# Patient Record
Sex: FEMALE | Race: WHITE | Employment: STUDENT | URBAN - METROPOLITAN AREA
[De-identification: names, ages, dates, MRNs, and addresses within clinical notes are randomized per-mention and may not be internally consistent; named-entity substitution may affect disease eponyms.]

---
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
## 2024-01-17 ENCOUNTER — LAB ENCOUNTER (OUTPATIENT)
Dept: LAB | Age: 19
End: 2024-01-17
Attending: FAMILY MEDICINE
Payer: COMMERCIAL

## 2024-01-17 ENCOUNTER — PATIENT MESSAGE (OUTPATIENT)
Dept: FAMILY MEDICINE CLINIC | Facility: CLINIC | Age: 19
End: 2024-01-17

## 2024-01-17 ENCOUNTER — OFFICE VISIT (OUTPATIENT)
Dept: FAMILY MEDICINE CLINIC | Facility: CLINIC | Age: 19
End: 2024-01-17

## 2024-01-17 VITALS
BODY MASS INDEX: 18.23 KG/M2 | HEART RATE: 89 BPM | HEIGHT: 62 IN | DIASTOLIC BLOOD PRESSURE: 62 MMHG | SYSTOLIC BLOOD PRESSURE: 96 MMHG | TEMPERATURE: 98 F | WEIGHT: 99.06 LBS

## 2024-01-17 DIAGNOSIS — N39.0 RECURRENT UTI: Primary | ICD-10-CM

## 2024-01-17 DIAGNOSIS — F50.9 EATING DISORDER, UNSPECIFIED TYPE: ICD-10-CM

## 2024-01-17 DIAGNOSIS — Z87.442 HISTORY OF NEPHROLITHIASIS: ICD-10-CM

## 2024-01-17 DIAGNOSIS — N39.0 RECURRENT UTI: ICD-10-CM

## 2024-01-17 LAB
BILIRUB UR QL: NEGATIVE
CLARITY UR: CLEAR
GLUCOSE UR-MCNC: NORMAL MG/DL
KETONES UR-MCNC: NEGATIVE MG/DL
LEUKOCYTE ESTERASE UR QL STRIP.AUTO: 75
NITRITE UR QL STRIP.AUTO: NEGATIVE
PH UR: 6.5 [PH] (ref 5–8)
PROT UR-MCNC: NEGATIVE MG/DL
SP GR UR STRIP: 1.01 (ref 1–1.03)
UROBILINOGEN UR STRIP-ACNC: NORMAL

## 2024-01-17 PROCEDURE — 3008F BODY MASS INDEX DOCD: CPT | Performed by: FAMILY MEDICINE

## 2024-01-17 PROCEDURE — 87086 URINE CULTURE/COLONY COUNT: CPT

## 2024-01-17 PROCEDURE — 3078F DIAST BP <80 MM HG: CPT | Performed by: FAMILY MEDICINE

## 2024-01-17 PROCEDURE — 3074F SYST BP LT 130 MM HG: CPT | Performed by: FAMILY MEDICINE

## 2024-01-17 PROCEDURE — 99214 OFFICE O/P EST MOD 30 MIN: CPT | Performed by: FAMILY MEDICINE

## 2024-01-17 PROCEDURE — 81001 URINALYSIS AUTO W/SCOPE: CPT

## 2024-01-17 NOTE — PROGRESS NOTES
HPI:    Patient ID: Lore Avila is a 18 year old adult.      HPI    Chief Complaint   Patient presents with    Urgent Care F/u     Has been getting frequent UTIS has been taking cranberry juice and using good hygiene     Other     Diagnosed with ARFID food disorder diagnosed thru her nutritionist would like to confirm due to insurance purposes.       Wt Readings from Last 6 Encounters:   01/17/24 99 lb 0.6 oz (44.9 kg) (3%, Z= -1.89)*   11/16/23 96 lb 3.2 oz (43.6 kg) (2%, Z= -2.15)*   11/09/23 97 lb (44 kg) (2%, Z= -2.07)*   10/23/23 96 lb (43.5 kg) (2%, Z= -2.17)*   10/03/23 95 lb 12.8 oz (43.5 kg) (1%, Z= -2.18)*   09/19/23 96 lb 6.4 oz (43.7 kg) (2%, Z= -2.12)*     * Growth percentiles are based on CDC (Girls, 2-20 Years) data.     BP Readings from Last 3 Encounters:   01/17/24 96/62   12/19/23 95/60   11/18/23 96/67     Feels she keeps getting \"utis\"  Thinks has had 6-7 this last year    Documented 4 positive urine cultures last year with e coli    Has had kidney stone twice- first 2021, saw DR Hearn  Then had stones again 10/2023    CT abd/ pelvis 10/23/23    CONCLUSION:      1. Multiple bilateral non-obstructing renal calculi measuring up to 3 mm.   2. Increased density at the bilateral corticomedullary junctions , which can be seen in the setting of medullary nephrocalcinosis.   3. No hydronephrosis.  Unremarkable appearance of the bladder.         does drink a lot of water - 16 oz x 3  Voids every 3 hrs   No blood    On her menses today    No back pain, fever/ chills      Patient seeing a therapist  Has been diagnosed avoidant restrictive food disorder   Seeing psychiatrist     Review of Systems   Constitutional:  Negative for chills and fever.   Respiratory:  Negative for cough and shortness of breath.    Gastrointestinal:  Negative for abdominal pain, constipation, diarrhea, nausea and vomiting.   Genitourinary:  Negative for decreased urine volume, dysuria, frequency, hematuria, menstrual problem,  pelvic pain and urgency.       BP 96/62   Pulse 89   Temp 98.1 °F (36.7 °C) (Temporal)   Ht 5' 2\" (1.575 m)   Wt 99 lb 0.6 oz (44.9 kg)   LMP 01/13/2024 (Exact Date)   BMI 18.11 kg/m²          Current Outpatient Medications   Medication Sig Dispense Refill    Norethin Ace-Eth Estrad-FE (JUNEL FE 1/20) 1-20 MG-MCG Oral Tab Take 1 tablet by mouth daily. 28 tablet 12    albuterol 108 (90 Base) MCG/ACT Inhalation Aero Soln Inhale 1 puff and hold breath for 10 seconds then release.  Wait 1 full minute and repeat for second puff.  Use every 4-6 hours as needed. 18 g 3    clonazePAM 0.5 MG Oral Tablet Dispersible Take 1 tablet (0.5 mg total) by mouth 2 (two) times daily as needed. 14 tablet 0     Allergies:  Allergies   Allergen Reactions    Penicillin G Benzathine RASH      PHYSICAL EXAM:     Chief Complaint   Patient presents with    Urgent Care F/u     Has been getting frequent UTIS has been taking cranberry juice and using good hygiene     Other     Diagnosed with ARFID food disorder diagnosed thru her nutritionist would like to confirm due to insurance purposes.      Physical Exam  Vitals reviewed.   Abdominal:      General: There is no distension.      Palpations: There is no mass.      Tenderness: There is abdominal tenderness. There is no right CVA tenderness, left CVA tenderness, guarding or rebound.      Hernia: No hernia is present.      Comments: Feels some discomfort left side abdomen    Musculoskeletal:      Cervical back: Normal range of motion and neck supple.   Neurological:      Mental Status: She is alert.                ASSESSMENT/PLAN:     Encounter Diagnoses   Name Primary?    Recurrent UTI Yes    History of nephrolithiasis     Eating disorder, unspecified type        1. Recurrent UTI  Push fluids  Cranberry juice  Ibuprofen  Void after sex  Wipe from front to back  No feminine products      - US KIDNEY/BLADDER (CPT=76770); Future  - UROLOGY - INTERNAL  - Urinalysis, Routine; Future  - Urine  Culture, Routine; Future    2. History of nephrolithiasis    - US KIDNEY/BLADDER (CPT=76770); Future  - UROLOGY - INTERNAL  - Urine Culture, Routine; Future    3. Eating disorder, unspecified type  Follow up psychiatry       Orders Placed This Encounter   Procedures    Urinalysis, Routine    Urine Culture, Routine         The above note was creating using Dragon speech recognition technology. Please excuse any typos    Meds This Visit:  Requested Prescriptions      No prescriptions requested or ordered in this encounter       Imaging & Referrals:  UROLOGY - INTERNAL  US KIDNEY/BLADDER (CPT=76770)       ID#[id number]

## 2024-01-19 NOTE — TELEPHONE ENCOUNTER
From: Lore Avila  To: Jenaro Jaquez  Sent: 1/17/2024 9:35 PM CST  Subject: test results    what do my test results mean exactly, do i have a uti cause i dont really understand what they mean

## 2024-01-29 ENCOUNTER — HOSPITAL ENCOUNTER (OUTPATIENT)
Dept: ULTRASOUND IMAGING | Age: 19
Discharge: HOME | End: 2024-01-29
Attending: FAMILY MEDICINE
Payer: COMMERCIAL

## 2024-01-29 DIAGNOSIS — N39.0 RECURRENT UTI: ICD-10-CM

## 2024-01-29 DIAGNOSIS — Z87.442 HISTORY OF NEPHROLITHIASIS: ICD-10-CM

## 2024-01-29 PROCEDURE — 76770 US EXAM ABDO BACK WALL COMP: CPT | Performed by: FAMILY MEDICINE

## 2024-01-29 RX ORDER — NORETHINDRONE ACETATE AND ETHINYL ESTRADIOL 1MG-20(21)
1 KIT ORAL DAILY
Qty: 28 TABLET | Refills: 12 | OUTPATIENT
Start: 2024-01-29 | End: 2025-01-28

## 2024-02-05 ENCOUNTER — OFFICE VISIT (OUTPATIENT)
Dept: FAMILY MEDICINE CLINIC | Facility: CLINIC | Age: 19
End: 2024-02-05
Payer: COMMERCIAL

## 2024-02-05 VITALS
HEIGHT: 62 IN | WEIGHT: 100.81 LBS | TEMPERATURE: 98 F | OXYGEN SATURATION: 98 % | BODY MASS INDEX: 18.55 KG/M2 | SYSTOLIC BLOOD PRESSURE: 98 MMHG | DIASTOLIC BLOOD PRESSURE: 62 MMHG | RESPIRATION RATE: 16 BRPM | HEART RATE: 83 BPM

## 2024-02-05 DIAGNOSIS — R39.9 UTI SYMPTOMS: Primary | ICD-10-CM

## 2024-02-05 DIAGNOSIS — Z76.89 RETURN TO WORK EVALUATION: ICD-10-CM

## 2024-02-05 LAB
APPEARANCE: CLEAR
BILIRUBIN: NEGATIVE
GLUCOSE (URINE DIPSTICK): NEGATIVE MG/DL
KETONES (URINE DIPSTICK): NEGATIVE MG/DL
MULTISTIX LOT#: ABNORMAL NUMERIC
NITRITE, URINE: NEGATIVE
OCCULT BLOOD: NEGATIVE
PH, URINE: 5.5 (ref 4.5–8)
PROTEIN (URINE DIPSTICK): NEGATIVE MG/DL
SPECIFIC GRAVITY: 1.01 (ref 1–1.03)
URINE-COLOR: YELLOW
UROBILINOGEN,SEMI-QN: 0.2 MG/DL (ref 0–1.9)

## 2024-02-05 PROCEDURE — 3008F BODY MASS INDEX DOCD: CPT | Performed by: NURSE PRACTITIONER

## 2024-02-05 PROCEDURE — 87086 URINE CULTURE/COLONY COUNT: CPT | Performed by: NURSE PRACTITIONER

## 2024-02-05 PROCEDURE — 3074F SYST BP LT 130 MM HG: CPT | Performed by: NURSE PRACTITIONER

## 2024-02-05 PROCEDURE — 3078F DIAST BP <80 MM HG: CPT | Performed by: NURSE PRACTITIONER

## 2024-02-05 PROCEDURE — 99213 OFFICE O/P EST LOW 20 MIN: CPT | Performed by: NURSE PRACTITIONER

## 2024-02-05 PROCEDURE — 81003 URINALYSIS AUTO W/O SCOPE: CPT | Performed by: NURSE PRACTITIONER

## 2024-02-05 NOTE — PROGRESS NOTES
[facility]  Obstetrics and Gynecology  Focused Gynecology Problem Visit  Vernell Espinal, MSN, APRN, FNP-BC    HPI   Lore Avila is a 18 year old adult who presents to the clinic for Nausea (Nausea, diarrhea, abd cramp, nasal congestion, on and off burning w urination, body aches, chills  /Sx onset Sat /Denies fever /Requesting work note )    Pt has a history of UTI's, currently has had intermittent burning with urination and some viral symptoms that started today. Denies other UTI symptoms today such as frequency, urgency, and incontinence. Denies vaginal symptoms such as changes in discharge, itch, odor, and irritation. Denies abnormal vaginal bleeding and pelvic pain. Declines STI testing today. Not taking any medication over the counter currently.  No COVID testing at home.  Received COVID vaccines.  Declines viral testing today.  Reports that her symptoms are mild.    MEDICATIONS     Current Outpatient Medications   Medication Sig Dispense Refill    Norethin Ace-Eth Estrad-FE (JUNEL FE 1/20) 1-20 MG-MCG Oral Tab Take 1 tablet by mouth daily. 28 tablet 12    albuterol 108 (90 Base) MCG/ACT Inhalation Aero Soln Inhale 1 puff and hold breath for 10 seconds then release.  Wait 1 full minute and repeat for second puff.  Use every 4-6 hours as needed. 18 g 3    clonazePAM 0.5 MG Oral Tablet Dispersible Take 1 tablet (0.5 mg total) by mouth 2 (two) times daily as needed. 14 tablet 0       ALLERGIES     Allergies   Allergen Reactions    Penicillin G Benzathine RASH       HISTORY     OB History   No obstetric history on file.       Past Medical History:   Diagnosis Date    Extrinsic asthma, unspecified     Nephrolithiasis     Scoliosis        Past Surgical History:   Procedure Laterality Date    OTHER SURGICAL HISTORY  10/30/2021    Lt RPG, Lt URS, Lt stent insertion  - Dr. Hearn       Family History   Problem Relation Age of Onset    No Known Problems Father     Cancer Paternal Grandmother     Suicide History  Maternal Cousin 19        crashed car into a tree    Diabetes Neg     Hypertension Neg     Heart Disorder Neg        Social History     Socioeconomic History    Marital status: Single     Spouse name: Not on file    Number of children: Not on file    Years of education: Not on file    Highest education level: Not on file   Occupational History    Not on file   Tobacco Use    Smoking status: Never     Passive exposure: Yes    Smokeless tobacco: Never   Vaping Use    Vaping Use: Former   Substance and Sexual Activity    Alcohol use: Yes     Comment: rarely at family partie - takes a sip.    Drug use: Not Currently     Types: Cannabis     Comment: a month and 1/2  ago smoked cannabis.  Used a couple puffs once a week.    Sexual activity: Not on file   Other Topics Concern     Service Not Asked    Blood Transfusions Not Asked    Caffeine Concern Yes     Comment: rarely soda    Occupational Exposure Not Asked    Hobby Hazards Not Asked    Sleep Concern Not Asked    Stress Concern Not Asked    Weight Concern Not Asked    Special Diet Not Asked    Back Care Not Asked    Exercise Not Asked    Bike Helmet Not Asked    Seat Belt Not Asked    Self-Exams Not Asked    Second-hand smoke exposure No    Alcohol/drug concerns No    Violence concerns No   Social History Narrative    Not on file     Social Determinants of Health     Financial Resource Strain: Not on file   Food Insecurity: Not on file   Transportation Needs: Not on file   Physical Activity: Not on file   Stress: Not on file   Social Connections: Not on file   Housing Stability: Not on file       ROS   Review of Systems   Constitutional:  Negative for chills and fever.   HENT:  Positive for congestion.    Gastrointestinal:  Positive for diarrhea and nausea.   Genitourinary:  Positive for dysuria. Negative for flank pain, frequency, hematuria, vaginal bleeding and vaginal discharge.   Musculoskeletal:  Positive for myalgias.   All other systems reviewed and are  negative.       PHYSICAL EXAM   BP 98/62   Pulse 83   Temp 98.2 °F (36.8 °C)   Resp 16   Ht 5' 2\" (1.575 m)   Wt 100 lb 12.8 oz (45.7 kg)   LMP 01/13/2024 (Exact Date)   SpO2 98%   BMI 18.44 kg/m²     Physical Exam  Constitutional:       Appearance: Normal appearance. She is not ill-appearing.   HENT:      Head: Normocephalic and atraumatic.      Right Ear: Tympanic membrane and ear canal normal.      Left Ear: Tympanic membrane and ear canal normal.      Nose: Congestion present. No rhinorrhea.      Mouth/Throat:      Mouth: Mucous membranes are moist.      Pharynx: Oropharynx is clear. No oropharyngeal exudate or posterior oropharyngeal erythema.   Eyes:      General: No scleral icterus.     Conjunctiva/sclera: Conjunctivae normal.   Cardiovascular:      Rate and Rhythm: Normal rate and regular rhythm.      Heart sounds: Normal heart sounds. No murmur heard.  Pulmonary:      Effort: Pulmonary effort is normal.      Breath sounds: Normal breath sounds. No wheezing.   Abdominal:      Tenderness: There is no abdominal tenderness. There is no right CVA tenderness or left CVA tenderness.   Neurological:      Mental Status: She is alert and oriented to person, place, and time.   Skin:     General: Skin is dry.   Psychiatric:         Mood and Affect: Mood normal.         Behavior: Behavior normal.   Vitals and nursing note reviewed.           ASSESSMENT       ICD-10-CM    1. UTI symptoms  R39.9 Urine Dip, auto without Micro     Urine Culture, Routine [E]     Urine Culture, Routine [E]      2. Return to work evaluation  Z76.89           PLAN   - Urine dip shows trace leukocytes, culture sent  - Will follow with results and recommendations  - If fever, chills, flank pain, nausea and/or vomiting occur, report to nearest emergency room for prompt re-evaluation  - Currently pt feels improved but requesting work note today for missed work today  - Pt verbalized understanding and questions answered  - Discussed  following up with PCP if recurrent UTI's continue, or see specialist/urologist for further evaluation    ORDERS     Orders Placed This Encounter   Procedures    Urine Dip, auto without Micro    Urine Culture, Routine [E]       PRESCRIPTIONS     Requested Prescriptions      No prescriptions requested or ordered in this encounter       IMAGING/ REFERRALS   None     Vernell Espinal, PATTIE  2/5/2024  12:13 PM

## 2024-03-20 ENCOUNTER — LAB ENCOUNTER (OUTPATIENT)
Dept: LAB | Age: 19
End: 2024-03-20
Payer: COMMERCIAL

## 2024-03-20 ENCOUNTER — OFFICE VISIT (OUTPATIENT)
Dept: FAMILY MEDICINE CLINIC | Facility: CLINIC | Age: 19
End: 2024-03-20

## 2024-03-20 VITALS
HEIGHT: 62 IN | DIASTOLIC BLOOD PRESSURE: 62 MMHG | BODY MASS INDEX: 18.29 KG/M2 | TEMPERATURE: 97 F | HEART RATE: 72 BPM | WEIGHT: 99.38 LBS | RESPIRATION RATE: 16 BRPM | OXYGEN SATURATION: 98 % | SYSTOLIC BLOOD PRESSURE: 91 MMHG

## 2024-03-20 DIAGNOSIS — N89.8 VAGINAL DISCHARGE: ICD-10-CM

## 2024-03-20 DIAGNOSIS — Z00.00 ROUTINE PHYSICAL EXAMINATION: ICD-10-CM

## 2024-03-20 DIAGNOSIS — Z00.00 ROUTINE PHYSICAL EXAMINATION: Primary | ICD-10-CM

## 2024-03-20 DIAGNOSIS — R42 LIGHTHEADEDNESS: ICD-10-CM

## 2024-03-20 DIAGNOSIS — Z11.3 SCREEN FOR STD (SEXUALLY TRANSMITTED DISEASE): ICD-10-CM

## 2024-03-20 DIAGNOSIS — R30.0 DYSURIA: ICD-10-CM

## 2024-03-20 DIAGNOSIS — J02.9 SORE THROAT: ICD-10-CM

## 2024-03-20 DIAGNOSIS — F41.1 GENERALIZED ANXIETY DISORDER: ICD-10-CM

## 2024-03-20 DIAGNOSIS — R10.817 GENERALIZED ABDOMINAL TENDERNESS WITHOUT REBOUND TENDERNESS: ICD-10-CM

## 2024-03-20 DIAGNOSIS — R07.89 CHEST PAIN, ATYPICAL: ICD-10-CM

## 2024-03-20 LAB
ALBUMIN SERPL-MCNC: 4.8 G/DL (ref 3.2–4.8)
ALBUMIN/GLOB SERPL: 1.9 {RATIO} (ref 1–2)
ALP LIVER SERPL-CCNC: 63 U/L
ALT SERPL-CCNC: 13 U/L
ANION GAP SERPL CALC-SCNC: 9 MMOL/L (ref 0–18)
AST SERPL-CCNC: 26 U/L (ref ?–34)
BASOPHILS # BLD AUTO: 0.03 X10(3) UL (ref 0–0.2)
BASOPHILS NFR BLD AUTO: 0.4 %
BILIRUB SERPL-MCNC: 0.9 MG/DL (ref 0.3–1.2)
BILIRUB UR QL: NEGATIVE
BUN BLD-MCNC: 7 MG/DL (ref 9–23)
BUN/CREAT SERPL: 8.2 (ref 10–20)
CALCIUM BLD-MCNC: 9.6 MG/DL (ref 8.7–10.4)
CHLORIDE SERPL-SCNC: 106 MMOL/L (ref 98–112)
CHOLEST SERPL-MCNC: 124 MG/DL (ref ?–200)
CLARITY UR: CLEAR
CO2 SERPL-SCNC: 26 MMOL/L (ref 21–32)
COLOR UR: COLORLESS
CONTROL LINE PRESENT WITH A CLEAR BACKGROUND (YES/NO): YES YES/NO
CREAT BLD-MCNC: 0.85 MG/DL
DEPRECATED HBV CORE AB SER IA-ACNC: 14.4 NG/ML
DEPRECATED RDW RBC AUTO: 45.1 FL (ref 35.1–46.3)
EGFRCR SERPLBLD CKD-EPI 2021: 101 ML/MIN/1.73M2 (ref 60–?)
EOSINOPHIL # BLD AUTO: 0.03 X10(3) UL (ref 0–0.7)
EOSINOPHIL NFR BLD AUTO: 0.4 %
ERYTHROCYTE [DISTWIDTH] IN BLOOD BY AUTOMATED COUNT: 13.9 % (ref 11–15)
FASTING PATIENT LIPID ANSWER: YES
FASTING STATUS PATIENT QL REPORTED: YES
GLOBULIN PLAS-MCNC: 2.5 G/DL (ref 2.8–4.4)
GLUCOSE BLD-MCNC: 88 MG/DL (ref 70–99)
GLUCOSE UR-MCNC: NORMAL MG/DL
HAV IGM SER QL: NONREACTIVE
HBV CORE IGM SER QL: NONREACTIVE
HBV SURFACE AG SERPL QL IA: NONREACTIVE
HCT VFR BLD AUTO: 42.5 %
HCV AB SERPL QL IA: NONREACTIVE
HDLC SERPL-MCNC: 43 MG/DL (ref 40–59)
HGB BLD-MCNC: 13.6 G/DL
HGB UR QL STRIP.AUTO: NEGATIVE
IMM GRANULOCYTES # BLD AUTO: 0.02 X10(3) UL (ref 0–1)
IMM GRANULOCYTES NFR BLD: 0.3 %
IRON SATN MFR SERPL: 7 %
IRON SERPL-MCNC: 31 UG/DL
KETONES UR-MCNC: NEGATIVE MG/DL
KIT LOT #: NORMAL NUMERIC
LDLC SERPL CALC-MCNC: 60 MG/DL (ref ?–100)
LEUKOCYTE ESTERASE UR QL STRIP.AUTO: NEGATIVE
LYMPHOCYTES # BLD AUTO: 2.44 X10(3) UL (ref 1.5–5)
LYMPHOCYTES NFR BLD AUTO: 32.1 %
MCH RBC QN AUTO: 28.5 PG (ref 26–34)
MCHC RBC AUTO-ENTMCNC: 32 G/DL (ref 31–37)
MCV RBC AUTO: 88.9 FL
MONOCYTES # BLD AUTO: 0.67 X10(3) UL (ref 0.1–1)
MONOCYTES NFR BLD AUTO: 8.8 %
NEUTROPHILS # BLD AUTO: 4.42 X10 (3) UL (ref 1.5–7.7)
NEUTROPHILS # BLD AUTO: 4.42 X10(3) UL (ref 1.5–7.7)
NEUTROPHILS NFR BLD AUTO: 58 %
NITRITE UR QL STRIP.AUTO: NEGATIVE
NONHDLC SERPL-MCNC: 81 MG/DL (ref ?–130)
OSMOLALITY SERPL CALC.SUM OF ELEC: 289 MOSM/KG (ref 275–295)
PH UR: 6 [PH] (ref 5–8)
PLATELET # BLD AUTO: 254 10(3)UL (ref 150–450)
POTASSIUM SERPL-SCNC: 3.9 MMOL/L (ref 3.5–5.1)
PROT SERPL-MCNC: 7.3 G/DL (ref 5.7–8.2)
PROT UR-MCNC: NEGATIVE MG/DL
RBC # BLD AUTO: 4.78 X10(6)UL
SODIUM SERPL-SCNC: 141 MMOL/L (ref 136–145)
SP GR UR STRIP: 1 (ref 1–1.03)
STREP GRP A CUL-SCR: NEGATIVE
TIBC SERPL-MCNC: 468 UG/DL (ref 250–425)
TRANSFERRIN SERPL-MCNC: 314 MG/DL
TRIGL SERPL-MCNC: 113 MG/DL (ref 30–149)
TSI SER-ACNC: 1.57 MIU/ML (ref 0.48–4.17)
UROBILINOGEN UR STRIP-ACNC: NORMAL
VLDLC SERPL CALC-MCNC: 17 MG/DL (ref 0–30)
WBC # BLD AUTO: 7.6 X10(3) UL (ref 4–11)

## 2024-03-20 PROCEDURE — 82728 ASSAY OF FERRITIN: CPT

## 2024-03-20 PROCEDURE — 3078F DIAST BP <80 MM HG: CPT

## 2024-03-20 PROCEDURE — 83540 ASSAY OF IRON: CPT

## 2024-03-20 PROCEDURE — 80061 LIPID PANEL: CPT

## 2024-03-20 PROCEDURE — 36415 COLL VENOUS BLD VENIPUNCTURE: CPT

## 2024-03-20 PROCEDURE — 84466 ASSAY OF TRANSFERRIN: CPT

## 2024-03-20 PROCEDURE — 85025 COMPLETE CBC W/AUTO DIFF WBC: CPT

## 2024-03-20 PROCEDURE — 3008F BODY MASS INDEX DOCD: CPT

## 2024-03-20 PROCEDURE — 99395 PREV VISIT EST AGE 18-39: CPT

## 2024-03-20 PROCEDURE — 81003 URINALYSIS AUTO W/O SCOPE: CPT

## 2024-03-20 PROCEDURE — 80053 COMPREHEN METABOLIC PANEL: CPT

## 2024-03-20 PROCEDURE — 84443 ASSAY THYROID STIM HORMONE: CPT

## 2024-03-20 PROCEDURE — 3074F SYST BP LT 130 MM HG: CPT

## 2024-03-20 PROCEDURE — 87880 STREP A ASSAY W/OPTIC: CPT

## 2024-03-20 PROCEDURE — 80074 ACUTE HEPATITIS PANEL: CPT

## 2024-03-20 PROCEDURE — 87389 HIV-1 AG W/HIV-1&-2 AB AG IA: CPT

## 2024-03-20 PROCEDURE — 86780 TREPONEMA PALLIDUM: CPT

## 2024-03-20 RX ORDER — NORETHINDRONE ACETATE AND ETHINYL ESTRADIOL 1MG-20(21)
1 KIT ORAL DAILY
Qty: 28 TABLET | Refills: 12 | Status: SHIPPED | OUTPATIENT
Start: 2024-03-20 | End: 2025-03-20

## 2024-03-20 RX ORDER — ALBUTEROL SULFATE 90 UG/1
AEROSOL, METERED RESPIRATORY (INHALATION)
Qty: 18 G | Refills: 3 | Status: SHIPPED | OUTPATIENT
Start: 2024-03-20

## 2024-03-20 NOTE — PROGRESS NOTES
Nanci:    Patient ID: Lore Avila is a 18 year old adult.    HPI  Chief Complaint   Patient presents with    Physical     Annual px    STD     Std testing; vaginal discharge x1 week; no itchiness, foul smell     Patient here in office for physical.  Has history of anxiety/depression.  Currently takes clonazepam 0.5 as needed.  Previously took sertraline and fluoxetine in the past (did not like side effects).  Scheduled to see psychiatrist in April 2024.  States she also smokes cannabis intermittently to assist with symptoms.  Denies suicidal/homicidal ideation.     Also has history of asthma.  Uses inhaler 4-5 times weekly as needed.  Asthma control test completed in office and shows stable symptoms management.      Complains of sore throat, headache, lightheadedness, and dizziness, started over 1 week ago.  Also reports intermittent chest pain, unsure if related to anxiety.  Last EKG completed on 11/18/2023 and was normal..  Cardiac echo also completed in November 2022 and was normal.    Concerned about alternating constipation/diarrhea with intermittent abdominal cramping.  Denies nausea, vomiting, or blood in stool.    Reports vaginal discharge.  Sexually active and interested in STI testing.    Requesting urine test due to burning with urination.  Denies frequency, urgency, or hematuria.    Review of Systems   Constitutional: Negative.  Negative for fever.   HENT:  Positive for congestion and sore throat.    Eyes:  Negative for visual disturbance.   Respiratory:  Positive for shortness of breath. Negative for cough.    Cardiovascular:  Positive for chest pain. Negative for palpitations.   Gastrointestinal:  Positive for constipation and diarrhea. Negative for abdominal pain, blood in stool, nausea and vomiting.   Genitourinary:  Positive for dysuria and vaginal discharge. Negative for frequency, menstrual problem and urgency.   Musculoskeletal: Negative.  Negative for arthralgias.   Skin: Negative.     Neurological:  Positive for dizziness, light-headedness and headaches.   Psychiatric/Behavioral:  Positive for dysphoric mood. Negative for suicidal ideas. The patient is nervous/anxious.        BP 91/62   Pulse 72   Temp 97.1 °F (36.2 °C) (Temporal)   Resp 16   Ht 5' 2\" (1.575 m)   Wt 99 lb 6 oz (45.1 kg)   LMP 03/06/2024 (Exact Date)   SpO2 98%   BMI 18.18 kg/m²     Past Medical History:   Diagnosis Date    Extrinsic asthma, unspecified     Nephrolithiasis     Scoliosis      Past Surgical History:   Procedure Laterality Date    OTHER SURGICAL HISTORY  10/30/2021    Lt RPG, Lt URS, Lt stent insertion  - Dr. Hearn     Social History     Socioeconomic History    Marital status: Single     Spouse name: Not on file    Number of children: Not on file    Years of education: Not on file    Highest education level: Not on file   Occupational History    Not on file   Tobacco Use    Smoking status: Never     Passive exposure: Yes    Smokeless tobacco: Never   Vaping Use    Vaping Use: Former   Substance and Sexual Activity    Alcohol use: Yes     Comment: rarely at family partie - takes a sip.    Drug use: Not Currently     Types: Cannabis     Comment: a month and 1/2  ago smoked cannabis.  Used a couple puffs once a week.    Sexual activity: Not on file   Other Topics Concern     Service Not Asked    Blood Transfusions Not Asked    Caffeine Concern Yes     Comment: rarely soda    Occupational Exposure Not Asked    Hobby Hazards Not Asked    Sleep Concern Not Asked    Stress Concern Not Asked    Weight Concern Not Asked    Special Diet Not Asked    Back Care Not Asked    Exercise Not Asked    Bike Helmet Not Asked    Seat Belt Not Asked    Self-Exams Not Asked    Second-hand smoke exposure No    Alcohol/drug concerns No    Violence concerns No   Social History Narrative    Not on file     Social Determinants of Health     Financial Resource Strain: Not on file   Food Insecurity: Not on file   Transportation  Needs: Not on file   Physical Activity: Not on file   Stress: Not on file   Social Connections: Not on file   Housing Stability: Not on file     Family History   Problem Relation Age of Onset    No Known Problems Father     Cancer Paternal Grandmother     Suicide History Maternal Cousin 19        crashed car into a tree    Diabetes Neg     Hypertension Neg     Heart Disorder Neg        Immunization History   Administered Date(s) Administered    Covid-19 Vaccine Pfizer 30 mcg/0.3 ml 04/16/2021, 05/07/2021    DTAP 10/12/2004, 01/28/2005, 08/21/2005, 07/28/2008    FLULAVAL 6 months & older 0.5 ml Prefilled syringe (46001) 12/08/2022    FLUZONE 6 months and older PFS 0.5 ml (10015) 12/08/2022    HEP A,Ped/Adol,(2 Dose) 08/13/2015, 07/02/2018    HEP B 07/24/2004, 09/10/2004, 08/21/2005    HIB 12/09/2004, 07/26/2005, 10/13/2005, 08/22/2006    Hpv Virus Vaccine 9 Mari Im 07/02/2018, 06/24/2021    IPV 11/11/2004, 02/24/2005, 08/21/2005, 07/28/2008    MMR 04/20/2006, 06/16/2009    Meningococcal Vaccine 06/24/2021    Meningococcal-Menactra 08/13/2015    Meningococcal-Menveo 2month-55yr 06/24/2021    TDAP 08/13/2015    Varicella 07/10/2008, 08/13/2015       Health Maintenance   Topic Date Due    Annual Physical  06/24/2022    COVID-19 Vaccine (3 - 2023-24 season) 09/01/2023    Influenza Vaccine (1) 10/01/2024 (Originally 10/1/2023)    Chlamydia Screening  06/26/2024    DTaP,Tdap,and Td Vaccines (6 - Td or Tdap) 08/13/2025    Annual Depression Screening  Completed    Hepatitis B Vaccines  Completed    Hepatitis A Vaccines  Completed    MMR Vaccines  Completed    Varicella Vaccines  Completed    Meningococcal Vaccine  Completed    HPV Vaccines  Completed    Pneumococcal Vaccine: Birth to 64yrs  Aged Out          Current Outpatient Medications   Medication Sig Dispense Refill    Norethin Ace-Eth Estrad-FE (JUNEL FE 1/20) 1-20 MG-MCG Oral Tab Take 1 tablet by mouth daily. 28 tablet 12    albuterol 108 (90 Base) MCG/ACT Inhalation  Aero Soln Inhale 1 puff and hold breath for 10 seconds then release.  Wait 1 full minute and repeat for second puff.  Use every 4-6 hours as needed. 18 g 3    famoTIDine 8 mg/ml Oral Suspension Take 2.5 mL (20 mg total) by mouth 2 (two) times daily. 150 mL 0    Terconazole 0.8 % Vaginal Cream Place 1 applicator vaginally nightly for 3 days. 20 g 0    clonazePAM 0.5 MG Oral Tablet Dispersible Take 1 tablet (0.5 mg total) by mouth 2 (two) times daily as needed. 14 tablet 0     Allergies:  Allergies   Allergen Reactions    Penicillin G Benzathine RASH      PHYSICAL EXAM:     Chief Complaint   Patient presents with    Physical     Annual px    STD     Std testing; vaginal discharge x1 week; no itchiness, foul smell      Physical Exam  Vitals reviewed.   Constitutional:       General: She is not in acute distress.     Appearance: Normal appearance. She is well-developed. She is not ill-appearing.   HENT:      Head: Normocephalic and atraumatic.      Right Ear: Tympanic membrane, ear canal and external ear normal. There is no impacted cerumen.      Left Ear: Tympanic membrane, ear canal and external ear normal. There is no impacted cerumen.      Nose: Nose normal. No congestion.      Mouth/Throat:      Mouth: Mucous membranes are moist.      Pharynx: Oropharynx is clear. No oropharyngeal exudate or posterior oropharyngeal erythema.   Eyes:      General:         Right eye: No discharge.         Left eye: No discharge.      Extraocular Movements: Extraocular movements intact.      Conjunctiva/sclera: Conjunctivae normal.      Pupils: Pupils are equal, round, and reactive to light.   Cardiovascular:      Rate and Rhythm: Normal rate and regular rhythm.      Heart sounds: Normal heart sounds. No murmur heard.     No friction rub. No gallop.   Pulmonary:      Effort: Pulmonary effort is normal. No respiratory distress.      Breath sounds: Normal breath sounds. No stridor. No wheezing, rhonchi or rales.   Chest:      Chest  wall: No tenderness.   Abdominal:      General: Bowel sounds are normal. There is no distension.      Palpations: Abdomen is soft. There is no mass.      Tenderness: There is abdominal tenderness. There is no right CVA tenderness, left CVA tenderness, guarding or rebound.      Hernia: No hernia is present.      Comments: Epigastric tenderness   Genitourinary:     General: Normal vulva.      Vagina: Normal.      Comments: White thick vaginal discharge  Musculoskeletal:         General: No tenderness. Normal range of motion.      Cervical back: Normal range of motion and neck supple.   Lymphadenopathy:      Cervical: No cervical adenopathy.   Skin:     General: Skin is warm and dry.      Findings: No rash.   Neurological:      General: No focal deficit present.      Mental Status: She is alert and oriented to person, place, and time.      Cranial Nerves: No cranial nerve deficit.      Sensory: No sensory deficit.      Motor: No weakness.      Deep Tendon Reflexes: Reflexes are normal and symmetric.   Psychiatric:         Mood and Affect: Mood normal.         Behavior: Behavior normal.         Thought Content: Thought content normal.         Judgment: Judgment normal.                ASSESSMENT/PLAN:     Return yearly for physicals  Follow up with dentist every 6 months  Follow up with eye doctor yearly  Recommend aerobic exercise for at least 30mins 5 days a week  Yearly flu shot  Tetanus booster every 10 years (Tdap/ Td)  Labs ordered/ or reviewed if done prior to appointment     Encounter Diagnoses   Name Primary?    Routine physical examination Yes    Generalized anxiety disorder     Chest pain, atypical     Lightheadedness     Dysuria     Sore throat     Screen for STD (sexually transmitted disease)     Vaginal discharge     Generalized abdominal tenderness without rebound tenderness        1. Routine physical examination  - CBC With Differential With Platelet; Future  - Comp Metabolic Panel (14); Future  - Lipid  Panel; Future  - TSH W Reflex To Free T4 [E]; Future    2. Generalized anxiety disorder  -Continue present management with psychiatrist    3. Chest pain, atypical  -Go to ER for worsening chest pain with palpitations, lightheadedness, or dizziness    4. Lightheadedness  - Iron And Tibc [E]; Future  - Ferritin [E]; Future    5. Dysuria  - Urinalysis with Culture Reflex; Future    6. Sore throat  -Rapid strep test completed in office and negative  - POC Rapid Strep [97089]    7. Screen for STD (sexually transmitted disease)  - Chlamydia/Gc Amplification; Future  - Hepatitis Panel, Acute (4); Future  - HIV Ag/Ab Combo; Future  - T Pallidum Screening Cascade; Future  - Chlamydia/Gc Amplification    8. Vaginal discharge  - Terconazole 0.8 % Vaginal Cream,  Place 1 applicator vaginally nightly for 3 days   - Vaginitis Vaginosis PCR Panel; Future  - Vaginitis Vaginosis PCR Panel    9. Generalized abdominal tenderness without rebound tenderness  -Suspect IBS versus acid reflux versus other  -famoTIDine 8 mg/ml Oral Suspension,  Take 2.5 mL (20 mg total) by mouth 2 (two) times daily.   -Encouraged patient to take probiotics over-the-counter      Orders Placed This Encounter   Procedures    CBC With Differential With Platelet    Comp Metabolic Panel (14)    Lipid Panel    TSH W Reflex To Free T4 [E]    Urinalysis with Culture Reflex    Iron And Tibc [E]    Ferritin [E]    POC Rapid Strep [17782]    Hepatitis Panel, Acute (4)    HIV Ag/Ab Combo    T Pallidum Screening Cascade    Chlamydia/Gc Amplification    Vaginitis Vaginosis PCR Panel       The above note was creating using Dragon speech recognition technology. Please excuse any typos    Meds This Visit:  Requested Prescriptions     Signed Prescriptions Disp Refills    Norethin Ace-Eth Estrad-FE (JUNEL FE 1/20) 1-20 MG-MCG Oral Tab 28 tablet 12     Sig: Take 1 tablet by mouth daily.    albuterol 108 (90 Base) MCG/ACT Inhalation Aero Soln 18 g 3     Sig: Inhale 1 puff and  hold breath for 10 seconds then release.  Wait 1 full minute and repeat for second puff.  Use every 4-6 hours as needed.    famoTIDine 8 mg/ml Oral Suspension 150 mL 0     Sig: Take 2.5 mL (20 mg total) by mouth 2 (two) times daily.    Terconazole 0.8 % Vaginal Cream 20 g 0     Sig: Place 1 applicator vaginally nightly for 3 days.       Imaging & Referrals:  None         PATTIE Bolden

## 2024-03-21 LAB
BV BACTERIA DNA VAG QL NAA+PROBE: NEGATIVE
C GLABRATA DNA VAG QL NAA+PROBE: NEGATIVE
C KRUSEI DNA VAG QL NAA+PROBE: NEGATIVE
C TRACH DNA SPEC QL NAA+PROBE: NEGATIVE
CANDIDA DNA VAG QL NAA+PROBE: NEGATIVE
N GONORRHOEA DNA SPEC QL NAA+PROBE: NEGATIVE
T PALLIDUM AB SER QL IA: NONREACTIVE
T VAGINALIS DNA VAG QL NAA+PROBE: NEGATIVE

## 2024-09-09 ENCOUNTER — HOSPITAL ENCOUNTER (OUTPATIENT)
Age: 19
Discharge: HOME | End: 2024-09-09
Payer: COMMERCIAL

## 2024-09-09 VITALS
DIASTOLIC BLOOD PRESSURE: 62 MMHG | HEART RATE: 80 BPM | RESPIRATION RATE: 18 BRPM | OXYGEN SATURATION: 98 % | TEMPERATURE: 99 F | SYSTOLIC BLOOD PRESSURE: 95 MMHG

## 2024-09-09 DIAGNOSIS — Z11.3 SCREENING EXAMINATION FOR STI: Primary | ICD-10-CM

## 2024-09-09 LAB
B-HCG UR QL: NEGATIVE
BILIRUB UR QL STRIP: NEGATIVE
COLOR UR: YELLOW
GLUCOSE UR STRIP-MCNC: NEGATIVE MG/DL
KETONES UR STRIP-MCNC: NEGATIVE MG/DL
NITRITE UR QL STRIP: NEGATIVE
PH UR STRIP: 8.5 [PH]
PROT UR STRIP-MCNC: 30 MG/DL
SP GR UR STRIP: 1.02
UROBILINOGEN UR STRIP-ACNC: <2 MG/DL

## 2024-09-09 PROCEDURE — 87491 CHLMYD TRACH DNA AMP PROBE: CPT | Performed by: NURSE PRACTITIONER

## 2024-09-09 PROCEDURE — 87591 N.GONORRHOEAE DNA AMP PROB: CPT | Performed by: NURSE PRACTITIONER

## 2024-09-09 PROCEDURE — 81025 URINE PREGNANCY TEST: CPT | Performed by: NURSE PRACTITIONER

## 2024-09-09 PROCEDURE — 81002 URINALYSIS NONAUTO W/O SCOPE: CPT | Performed by: NURSE PRACTITIONER

## 2024-09-09 PROCEDURE — 87086 URINE CULTURE/COLONY COUNT: CPT | Performed by: NURSE PRACTITIONER

## 2024-09-09 PROCEDURE — 87077 CULTURE AEROBIC IDENTIFY: CPT | Performed by: NURSE PRACTITIONER

## 2024-09-09 PROCEDURE — 99214 OFFICE O/P EST MOD 30 MIN: CPT | Performed by: NURSE PRACTITIONER

## 2024-09-09 NOTE — DISCHARGE INSTRUCTIONS
Your urine culture and gonorrhea and Chlamydia testing is pending.  Follow-up as needed with your doctor or gynecologist.  Return for any concerns.

## 2024-09-09 NOTE — ED PROVIDER NOTES
He    Patient Seen in: [facility]      History     Chief Complaint   Patient presents with    Eval-G     Stated Complaint: std, preg, gen chck up  Subjective:   19-year-old presents for an STD screening and a pregnancy test.  She states her last menses was in the middle of July.  She states she did take a test 2 weeks ago at home that was negative.  She denies any abdominal pain or cramping.  She states she did have some spotting about a week ago that resolved on its own.  No nausea or vomiting.  No fevers.  No vaginal discharge.  No dysuria, urgency, frequency, or hematuria.  She appears nontoxic.      Objective:   Past Medical History:    Extrinsic asthma, unspecified    Nephrolithiasis    Scoliosis            Past Surgical History:   Procedure Laterality Date    Other surgical history  10/30/2021    Lt RPG, Lt URS, Lt stent insertion  - Dr. Hearn              No pertinent social history.          Review of Systems    Positive for stated complaint: Eval-G     Other systems are as noted in HPI.  Constitutional and vital signs reviewed.      All other systems reviewed and negative except as noted above.    Physical Exam     ED Triage Vitals [09/09/24 1136]   BP 95/62   Pulse 80   Resp 18   Temp 98.7 °F (37.1 °C)   Temp src Temporal   SpO2 98 %   O2 Device None (Room air)     Current:BP 95/62   Pulse 80   Temp 98.7 °F (37.1 °C) (Temporal)   Resp 18   LMP 07/16/2024 (Exact Date)   SpO2 98%     Physical Exam  Vitals and nursing note reviewed.   Constitutional:       General: She is not in acute distress.     Appearance: Normal appearance. She is not toxic-appearing.   HENT:      Nose: Nose normal.      Mouth/Throat:      Mouth: Mucous membranes are moist.   Eyes:      Pupils: Pupils are equal, round, and reactive to light.   Cardiovascular:      Rate and Rhythm: Normal rate and regular rhythm.   Pulmonary:      Effort: Pulmonary effort is normal.      Breath sounds: Normal breath sounds.   Abdominal:       Palpations: Abdomen is soft.      Tenderness: There is no abdominal tenderness. There is no right CVA tenderness or left CVA tenderness.   Musculoskeletal:         General: Normal range of motion.   Skin:     General: Skin is warm and dry.      Capillary Refill: Capillary refill takes less than 2 seconds.   Neurological:      General: No focal deficit present.      Mental Status: She is alert and oriented to person, place, and time.   Psychiatric:         Mood and Affect: Mood normal.         Behavior: Behavior normal.         ED Course   No results found.  Labs Reviewed   [facility] POCT URINALYSIS DIPSTICK - Abnormal; Notable for the following components:       Result Value    Urine Clarity Cloudy (*)     PH, Urine 8.5 (*)     Protein urine 30 (*)     Blood, Urine Trace-Intact (*)     Leukocyte esterase urine Small (*)     All other components within normal limits   POCT PREGNANCY URINE - Normal   CHLAMYDIA/GONOCOCCUS, DARCIE   URINE CULTURE, ROUTINE       MDM     Medical Decision Making  The patient is aware of her urine dip results below.  She is aware her cultures are pending.  She will hold off any treatment until the cultures return.  She has a gynecologist she can follow-up with.    Amount and/or Complexity of Data Reviewed  Labs: ordered.     Details: The UCG is negative.  The urine dip shows small leukocytes, trace blood, and 30 of protein.  A urine culture is pending.  A gonorrhea and chlamydia of the urine is pending.      Risk  Risk Details: STI versus pregnancy versus amenorrhea        Disposition and Plan     Clinical Impression:  1. Screening examination for STI         Disposition:  Discharge  9/9/2024 12:13 pm    Follow-up:  Jenaro Jaquez MD  [address] [place] [address] [id number] [phone]    Schedule an appointment as soon as possible for a visit   As needed          Medications Prescribed:  Discharge Medication List as of 9/9/2024 12:22 PM

## 2024-09-10 LAB
C TRACH DNA SPEC QL NAA+PROBE: NEGATIVE
N GONORRHOEA DNA SPEC QL NAA+PROBE: NEGATIVE

## 2024-09-11 RX ORDER — NITROFURANTOIN 25; 75 MG/1; MG/1
100 CAPSULE ORAL 2 TIMES DAILY
Qty: 14 CAPSULE | Refills: 0 | Status: SHIPPED | OUTPATIENT
Start: 2024-09-11 | End: 2024-09-18

## 2024-09-23 ENCOUNTER — OFFICE VISIT (OUTPATIENT)
Dept: FAMILY MEDICINE CLINIC | Facility: CLINIC | Age: 19
End: 2024-09-23

## 2024-09-23 ENCOUNTER — LAB ENCOUNTER (OUTPATIENT)
Dept: LAB | Age: 19
End: 2024-09-23
Payer: COMMERCIAL

## 2024-09-23 VITALS
SYSTOLIC BLOOD PRESSURE: 94 MMHG | OXYGEN SATURATION: 97 % | WEIGHT: 109.38 LBS | HEIGHT: 62 IN | HEART RATE: 87 BPM | DIASTOLIC BLOOD PRESSURE: 61 MMHG | BODY MASS INDEX: 20.13 KG/M2 | RESPIRATION RATE: 16 BRPM | TEMPERATURE: 98 F

## 2024-09-23 DIAGNOSIS — N92.6 MENSTRUAL IRREGULARITY: ICD-10-CM

## 2024-09-23 DIAGNOSIS — N39.0 URINARY TRACT INFECTION WITH HEMATURIA, SITE UNSPECIFIED: ICD-10-CM

## 2024-09-23 DIAGNOSIS — N89.8 VAGINAL DISCHARGE: ICD-10-CM

## 2024-09-23 DIAGNOSIS — N92.6 MENSTRUAL IRREGULARITY: Primary | ICD-10-CM

## 2024-09-23 DIAGNOSIS — R31.9 URINARY TRACT INFECTION WITH HEMATURIA, SITE UNSPECIFIED: ICD-10-CM

## 2024-09-23 LAB
DEPRECATED HBV CORE AB SER IA-ACNC: 8 NG/ML
HCG SERPL QL: NEGATIVE
T3FREE SERPL-MCNC: 3.21 PG/ML (ref 2.4–4.2)
T4 FREE SERPL-MCNC: 1.3 NG/DL (ref 0.8–1.7)
TSI SER-ACNC: 0.53 MIU/ML (ref 0.55–4.78)

## 2024-09-23 PROCEDURE — 84481 FREE ASSAY (FT-3): CPT

## 2024-09-23 PROCEDURE — 3008F BODY MASS INDEX DOCD: CPT

## 2024-09-23 PROCEDURE — 84703 CHORIONIC GONADOTROPIN ASSAY: CPT

## 2024-09-23 PROCEDURE — 3078F DIAST BP <80 MM HG: CPT

## 2024-09-23 PROCEDURE — 84443 ASSAY THYROID STIM HORMONE: CPT

## 2024-09-23 PROCEDURE — 82728 ASSAY OF FERRITIN: CPT

## 2024-09-23 PROCEDURE — 36415 COLL VENOUS BLD VENIPUNCTURE: CPT

## 2024-09-23 PROCEDURE — 3074F SYST BP LT 130 MM HG: CPT

## 2024-09-23 PROCEDURE — 84439 ASSAY OF FREE THYROXINE: CPT

## 2024-09-23 PROCEDURE — 99213 OFFICE O/P EST LOW 20 MIN: CPT

## 2024-09-23 NOTE — PROGRESS NOTES
HPI:    Patient ID: Lore Avila is a 19 year old adult.    HPI     Patient here in office with complaint of menstrual irregularity (has been having bleeding for 2 weeks).  States menstrual blood brown in color.  Also reports intermittent vaginal discharge.  Was seen in immediate care and GC/chlamydia, urine pregnancy completed and was.  Urinalysis completed and showed trace blood.  Urine culture sent to lab and showed UTI.  Patient started on Macrobid 100 mg twice daily x 7 days (did not start medication).  States she now has hematuria, not present during immediate care visit.  Also taking oral contraceptives at different times during the day.      Review of Systems   Constitutional: Negative.    Respiratory: Negative.     Cardiovascular: Negative.    Genitourinary:  Positive for hematuria, menstrual problem and vaginal discharge.   Skin: Negative.    Neurological: Negative.    Psychiatric/Behavioral: Negative.              Current Outpatient Medications   Medication Sig Dispense Refill    Norethin Ace-Eth Estrad-FE (JUNEL FE 1/20) 1-20 MG-MCG Oral Tab Take 1 tablet by mouth daily. 28 tablet 12    albuterol 108 (90 Base) MCG/ACT Inhalation Aero Soln Inhale 1 puff and hold breath for 10 seconds then release.  Wait 1 full minute and repeat for second puff.  Use every 4-6 hours as needed. 18 g 3    clonazePAM 0.5 MG Oral Tablet Dispersible Take 1 tablet (0.5 mg total) by mouth 2 (two) times daily as needed. 14 tablet 0     Allergies:  Allergies   Allergen Reactions    Penicillin G Benzathine RASH      BP 94/61   Pulse 87   Temp 98.2 °F (36.8 °C) (Temporal)   Resp 16   Ht 5' 2\" (1.575 m)   Wt 109 lb 6 oz (49.6 kg)   LMP 09/10/2024 (Exact Date)   SpO2 97%   BMI 20.00 kg/m²   Body mass index is 20 kg/m².  PHYSICAL EXAM:   Physical Exam  Vitals reviewed.   Constitutional:       Appearance: Normal appearance.   Pulmonary:      Effort: Pulmonary effort is normal. No respiratory distress.   Abdominal:       Tenderness: There is abdominal tenderness. There is no right CVA tenderness or left CVA tenderness.      Comments: Suprapubic tenderness   Genitourinary:     Vagina: Vaginal discharge present.   Skin:     General: Skin is warm.      Findings: No rash.   Neurological:      General: No focal deficit present.      Mental Status: She is alert and oriented to person, place, and time.   Psychiatric:         Mood and Affect: Mood normal.         Behavior: Behavior normal.         Thought Content: Thought content normal.         Judgment: Judgment normal.                ASSESSMENT/PLAN:   1. Menstrual irregularity  -Will await results as listed below.  If normal, will consider change of oral contraceptives.  Encourage patient to take oral contraceptive daily at the same time to reduce breakthrough vaginal bleeding  - TSH W Reflex To Free T4 [E]; Future  - Ferritin [E]; Future  - HCG, Beta Subunit, Qual [E]; Future    2. Vaginal discharge  - Vaginitis Vaginosis PCR Panel; Future    3. Urinary tract infection with hematuria, site unspecified  - Advised patient to take Macrobid 100 mg twice daily x 7 days as ordered in immediate care      Orders Placed This Encounter   Procedures    TSH W Reflex To Free T4 [E]    Ferritin [E]    HCG, Beta Subunit, Qual [E]    Vaginitis Vaginosis PCR Panel       Meds This Visit:  Requested Prescriptions      No prescriptions requested or ordered in this encounter       Imaging & Referrals:  None         ID#[id number]

## 2024-09-24 LAB
BV BACTERIA DNA VAG QL NAA+PROBE: NEGATIVE
C GLABRATA DNA VAG QL NAA+PROBE: NEGATIVE
C KRUSEI DNA VAG QL NAA+PROBE: NEGATIVE
CANDIDA DNA VAG QL NAA+PROBE: POSITIVE
T VAGINALIS DNA VAG QL NAA+PROBE: NEGATIVE

## 2024-09-25 ENCOUNTER — PATIENT MESSAGE (OUTPATIENT)
Dept: FAMILY MEDICINE CLINIC | Facility: CLINIC | Age: 19
End: 2024-09-25

## 2024-09-25 NOTE — TELEPHONE ENCOUNTER
From: Lore Avila  To: Milana Zhang  Sent: 9/25/2024 8:38 AM CDT  Subject: medicine and period    hello! so i cant find the antibiotics prescribed to me for the uti, so could you give me some more, also are you gonna treat the yeast infection? And i havent taken the birth control since Saturday and today my period is really heavy and my cramps are awful! is this normal?

## 2024-10-05 ENCOUNTER — OFFICE VISIT (OUTPATIENT)
Dept: FAMILY MEDICINE CLINIC | Facility: CLINIC | Age: 19
End: 2024-10-05

## 2024-10-05 ENCOUNTER — LAB ENCOUNTER (OUTPATIENT)
Dept: LAB | Age: 19
End: 2024-10-05
Attending: FAMILY MEDICINE
Payer: COMMERCIAL

## 2024-10-05 VITALS
HEART RATE: 97 BPM | DIASTOLIC BLOOD PRESSURE: 67 MMHG | TEMPERATURE: 98 F | WEIGHT: 109 LBS | HEIGHT: 62 IN | BODY MASS INDEX: 20.06 KG/M2 | SYSTOLIC BLOOD PRESSURE: 102 MMHG

## 2024-10-05 DIAGNOSIS — R10.10 UPPER ABDOMINAL PAIN: Primary | ICD-10-CM

## 2024-10-05 DIAGNOSIS — R79.89 LOW TSH LEVEL: ICD-10-CM

## 2024-10-05 DIAGNOSIS — R10.10 UPPER ABDOMINAL PAIN: ICD-10-CM

## 2024-10-05 LAB
ALBUMIN SERPL-MCNC: 4.7 G/DL (ref 3.2–4.8)
ALBUMIN/GLOB SERPL: 2 {RATIO} (ref 1–2)
ALP LIVER SERPL-CCNC: 72 U/L
ALT SERPL-CCNC: 17 U/L
ANION GAP SERPL CALC-SCNC: 6 MMOL/L (ref 0–18)
AST SERPL-CCNC: 22 U/L (ref ?–34)
BASOPHILS # BLD AUTO: 0.03 X10(3) UL (ref 0–0.2)
BASOPHILS NFR BLD AUTO: 0.3 %
BILIRUB SERPL-MCNC: 1.4 MG/DL (ref 0.3–1.2)
BUN BLD-MCNC: 9 MG/DL (ref 9–23)
BUN/CREAT SERPL: 10.2 (ref 10–20)
CALCIUM BLD-MCNC: 10.2 MG/DL (ref 8.7–10.4)
CHLORIDE SERPL-SCNC: 105 MMOL/L (ref 98–112)
CO2 SERPL-SCNC: 28 MMOL/L (ref 21–32)
CREAT BLD-MCNC: 0.88 MG/DL
DEPRECATED RDW RBC AUTO: 44.9 FL (ref 35.1–46.3)
EGFRCR SERPLBLD CKD-EPI 2021: 96 ML/MIN/1.73M2 (ref 60–?)
EOSINOPHIL # BLD AUTO: 0.06 X10(3) UL (ref 0–0.7)
EOSINOPHIL NFR BLD AUTO: 0.6 %
ERYTHROCYTE [DISTWIDTH] IN BLOOD BY AUTOMATED COUNT: 13 % (ref 11–15)
FASTING STATUS PATIENT QL REPORTED: NO
GLOBULIN PLAS-MCNC: 2.3 G/DL (ref 2–3.5)
GLUCOSE BLD-MCNC: 76 MG/DL (ref 70–99)
HCT VFR BLD AUTO: 42.1 %
HGB BLD-MCNC: 13.3 G/DL
IMM GRANULOCYTES # BLD AUTO: 0.03 X10(3) UL (ref 0–1)
IMM GRANULOCYTES NFR BLD: 0.3 %
LYMPHOCYTES # BLD AUTO: 3 X10(3) UL (ref 1–4)
LYMPHOCYTES NFR BLD AUTO: 29.5 %
MCH RBC QN AUTO: 29.6 PG (ref 26–34)
MCHC RBC AUTO-ENTMCNC: 31.6 G/DL (ref 31–37)
MCV RBC AUTO: 93.8 FL
MONOCYTES # BLD AUTO: 0.5 X10(3) UL (ref 0.1–1)
MONOCYTES NFR BLD AUTO: 4.9 %
NEUTROPHILS # BLD AUTO: 6.54 X10 (3) UL (ref 1.5–7.7)
NEUTROPHILS # BLD AUTO: 6.54 X10(3) UL (ref 1.5–7.7)
NEUTROPHILS NFR BLD AUTO: 64.4 %
OSMOLALITY SERPL CALC.SUM OF ELEC: 285 MOSM/KG (ref 275–295)
PLATELET # BLD AUTO: 312 10(3)UL (ref 150–450)
POTASSIUM SERPL-SCNC: 3.7 MMOL/L (ref 3.5–5.1)
PROT SERPL-MCNC: 7 G/DL (ref 5.7–8.2)
RBC # BLD AUTO: 4.49 X10(6)UL
SODIUM SERPL-SCNC: 139 MMOL/L (ref 136–145)
WBC # BLD AUTO: 10.2 X10(3) UL (ref 4–11)

## 2024-10-05 PROCEDURE — 3008F BODY MASS INDEX DOCD: CPT | Performed by: FAMILY MEDICINE

## 2024-10-05 PROCEDURE — 85025 COMPLETE CBC W/AUTO DIFF WBC: CPT

## 2024-10-05 PROCEDURE — 3074F SYST BP LT 130 MM HG: CPT | Performed by: FAMILY MEDICINE

## 2024-10-05 PROCEDURE — 99214 OFFICE O/P EST MOD 30 MIN: CPT | Performed by: FAMILY MEDICINE

## 2024-10-05 PROCEDURE — 80053 COMPREHEN METABOLIC PANEL: CPT

## 2024-10-05 PROCEDURE — 3078F DIAST BP <80 MM HG: CPT | Performed by: FAMILY MEDICINE

## 2024-10-05 PROCEDURE — 36415 COLL VENOUS BLD VENIPUNCTURE: CPT

## 2024-10-05 RX ORDER — FAMOTIDINE 40 MG/5ML
20 POWDER, FOR SUSPENSION ORAL 2 TIMES DAILY
Qty: 70 ML | Refills: 0 | Status: SHIPPED | OUTPATIENT
Start: 2024-10-05 | End: 2024-10-19

## 2024-10-05 RX ORDER — ESCITALOPRAM OXALATE 5 MG/5ML
5 SOLUTION ORAL DAILY
COMMUNITY
Start: 2024-10-01

## 2024-10-05 NOTE — PROGRESS NOTES
HPI:    Patient ID: Lore Avila is a 19 year old adult.      Abdominal Pain  Pertinent negatives include no constipation, diarrhea, fever, headaches, nausea or vomiting.       Chief Complaint   Patient presents with    Follow - Up     On thyroid levels     Abdominal Pain     Abdominal cramps in mid stomach on and off X 1 week        Wt Readings from Last 6 Encounters:   10/05/24 109 lb (49.4 kg)   09/23/24 109 lb 6 oz (49.6 kg)   03/20/24 99 lb 6 oz (45.1 kg) (3%, Z= -1.87)*   02/05/24 100 lb 12.8 oz (45.7 kg) (4%, Z= -1.73)*   01/17/24 99 lb 0.6 oz (44.9 kg) (3%, Z= -1.89)*   11/16/23 96 lb 3.2 oz (43.6 kg) (2%, Z= -2.15)*     * Growth percentiles are based on [facility] (Girls, 2-20 Years) data.     BP Readings from Last 3 Encounters:   10/05/24 102/67   09/23/24 94/61   09/09/24 95/62     Saw Milana MONROY and recent thyroid on 9/23 was low 0.529  Has bene gaining weight  Gets dizzy. Low energy.  Plans to repeat thyroid tests today    Has \"weird cramping\" mid abdomen x 1 week and yesterday traveled to her upper chest.  No heartburn  No nausea/ vomiting/ constipation/ diarrhea.    Gets menses monthly.  Not pregnant     No trouble urinating     Has not started lexpro yet.  Stopped ocps 2 weeks ago as didn't go  refill       Review of Systems   Constitutional:  Negative for chills and fever.   Eyes:  Negative for visual disturbance.   Respiratory:  Negative for cough, shortness of breath and wheezing.    Cardiovascular:  Negative for chest pain, palpitations and leg swelling.   Gastrointestinal:  Positive for abdominal pain. Negative for anal bleeding, blood in stool, constipation, diarrhea, nausea, rectal pain and vomiting.   Genitourinary:  Negative for decreased urine volume and difficulty urinating.   Musculoskeletal:  Negative for back pain.   Neurological:  Negative for dizziness, tremors, seizures, weakness, light-headedness, numbness and headaches.       /67   Pulse 97   Temp 98.3 °F (36.8 °C)  (Temporal)   Ht 5' 2\" (1.575 m)   Wt 109 lb (49.4 kg)   LMP 09/10/2024 (Exact Date)   BMI 19.94 kg/m²          Current Outpatient Medications   Medication Sig Dispense Refill    famoTIDine 40 MG/5ML Oral Recon Susp Take 2.5 mL (20 mg total) by mouth 2 (two) times daily for 14 days. 70 mL 0    Norethin Ace-Eth Estrad-FE (JUNEL FE 1/20) 1-20 MG-MCG Oral Tab Take 1 tablet by mouth daily. 28 tablet 12    albuterol 108 (90 Base) MCG/ACT Inhalation Aero Soln Inhale 1 puff and hold breath for 10 seconds then release.  Wait 1 full minute and repeat for second puff.  Use every 4-6 hours as needed. 18 g 3    clonazePAM 0.5 MG Oral Tablet Dispersible Take 1 tablet (0.5 mg total) by mouth 2 (two) times daily as needed. 14 tablet 0    escitalopram 5 MG/5ML Oral Solution Take 5 mL (5 mg total) by mouth daily. (Patient not taking: Reported on 10/5/2024)       Allergies:  Allergies   Allergen Reactions    Penicillin G Benzathine RASH      PHYSICAL EXAM:     Chief Complaint   Patient presents with    Follow - Up     On thyroid levels     Abdominal Pain     Abdominal cramps in mid stomach on and off X 1 week       Physical Exam  Vitals and nursing note reviewed.   Constitutional:       Appearance: She is well-developed.   Eyes:      Pupils: Pupils are equal, round, and reactive to light.   Neck:      Thyroid: No thyromegaly.   Cardiovascular:      Rate and Rhythm: Normal rate and regular rhythm.      Heart sounds: No murmur heard.  Pulmonary:      Effort: Pulmonary effort is normal.      Breath sounds: Normal breath sounds. No wheezing.   Abdominal:      Palpations: There is no mass.      Tenderness: There is abdominal tenderness in the epigastric area. There is no right CVA tenderness or left CVA tenderness.   Musculoskeletal:      Cervical back: Normal range of motion and neck supple.      Right lower leg: No edema.      Left lower leg: No edema.   Skin:     General: Skin is warm and dry.      Findings: No rash.   Neurological:       Mental Status: She is alert and oriented to person, place, and time.                ASSESSMENT/PLAN:     Encounter Diagnoses   Name Primary?    Upper abdominal pain Yes    Low TSH level        1. Upper abdominal pain  Patient to avoid eating spicy foods.  Recommend bland diet.  Recommend completing blood work and avoiding any marijuana.  Patient states last use marijuana about 2 months ago  If no improvement or worsening pain to go to ER otherwise follow-up if pain unchanged  - CBC With Differential With Platelet; Future  - Comp Metabolic Panel (14); Future  - famoTIDine 40 MG/5ML Oral Recon Susp; Take 2.5 mL (20 mg total) by mouth 2 (two) times daily for 14 days.  Dispense: 70 mL; Refill: 0    2. Low TSH level  TSH levels noted.  Patient has repeat thyroid orders in epic already.  Avoid any vitamins for 3 days prior to the blood test      Orders Placed This Encounter   Procedures    CBC With Differential With Platelet    Comp Metabolic Panel (14)         The above note was creating using Dragon speech recognition technology. Please excuse any typos    Meds This Visit:  Requested Prescriptions     Signed Prescriptions Disp Refills    famoTIDine 40 MG/5ML Oral Recon Susp 70 mL 0     Sig: Take 2.5 mL (20 mg total) by mouth 2 (two) times daily for 14 days.       Imaging & Referrals:  None       ID#[id number]

## 2024-10-14 ENCOUNTER — OFFICE VISIT (OUTPATIENT)
Dept: FAMILY MEDICINE CLINIC | Facility: CLINIC | Age: 19
End: 2024-10-14
Payer: COMMERCIAL

## 2024-10-14 VITALS
BODY MASS INDEX: 20.06 KG/M2 | TEMPERATURE: 98 F | SYSTOLIC BLOOD PRESSURE: 98 MMHG | HEART RATE: 63 BPM | HEIGHT: 62 IN | DIASTOLIC BLOOD PRESSURE: 58 MMHG | OXYGEN SATURATION: 98 % | RESPIRATION RATE: 16 BRPM | WEIGHT: 109 LBS

## 2024-10-14 DIAGNOSIS — Z01.89 PATIENT REQUESTED DIAGNOSTIC TESTING: ICD-10-CM

## 2024-10-14 DIAGNOSIS — J06.9 VIRAL URI WITH COUGH: Primary | ICD-10-CM

## 2024-10-14 NOTE — PROGRESS NOTES
CHIEF COMPLAINT:     Chief Complaint   Patient presents with    Sore Throat     Sx 1 week - ST, nasal congestion, bilat ear pressure, bilat muffled hearing, sinus pressure, dry and productive cough, chest congestion, chills, body aches  Denies fever, loss of appetite, nausea, vomiting, diarrhea, rash, SOB  No Covid test was done at home  OTC cough syrup       HPI:   Lore Avila is a 19 year old adult who presents for upper respiratory symptoms for  1 weeks. Patient reports  sore throat, congestion, bilateral ear pressure, sinus pressure, dry/productive cough, chest congestion, chills, and body aches. . Symptoms have been worse since onset.  Treating symptoms with cough syrup.   Associated symptoms include see nausea (baseline for her) and intermittent SOB.     No home covid testing. No fever, n/v/d, abdominal pain, or rash. No leg pain or swelling. No hx of prolonged immobilization.     Eating and drinking well.     Pt requesting strep testing.     Current Outpatient Medications   Medication Sig Dispense Refill    escitalopram 5 MG/5ML Oral Solution Take 5 mL (5 mg total) by mouth daily.      famoTIDine 40 MG/5ML Oral Recon Susp Take 2.5 mL (20 mg total) by mouth 2 (two) times daily for 14 days. 70 mL 0    Norethin Ace-Eth Estrad-FE (JUNEL FE 1/20) 1-20 MG-MCG Oral Tab Take 1 tablet by mouth daily. 28 tablet 12    albuterol 108 (90 Base) MCG/ACT Inhalation Aero Soln Inhale 1 puff and hold breath for 10 seconds then release.  Wait 1 full minute and repeat for second puff.  Use every 4-6 hours as needed. 18 g 3    clonazePAM 0.5 MG Oral Tablet Dispersible Take 1 tablet (0.5 mg total) by mouth 2 (two) times daily as needed. 14 tablet 0      Past Medical History:    Extrinsic asthma, unspecified    Nephrolithiasis    Scoliosis      Past Surgical History:   Procedure Laterality Date    Other surgical history  10/30/2021    Lt RPG, Lt URS, Lt stent insertion  - Dr. Hearn         Social History     Socioeconomic  History    Marital status: Single   Tobacco Use    Smoking status: Never     Passive exposure: Yes    Smokeless tobacco: Never   Vaping Use    Vaping status: Former   Substance and Sexual Activity    Alcohol use: Yes     Comment: rarely at family partie - takes a sip.    Drug use: Not Currently     Types: Cannabis     Comment: a month and 1/2  ago smoked cannabis.  Used a couple puffs once a week.   Other Topics Concern    Caffeine Concern Yes     Comment: rarely soda    Second-hand smoke exposure No    Alcohol/drug concerns No    Violence concerns No         REVIEW OF SYSTEMS:   GENERAL: good appetite  SKIN: no rashes or abnormal skin lesions  HEENT: See HPI  LUNGS: denies wheezing, See HPI  CARDIOVASCULAR: denies chest pain or palpitations   GI: denies N/V/C or abdominal pain  NEURO: + headaches    EXAM:   BP 98/58   Pulse 63   Temp 97.9 °F (36.6 °C)   Resp 16   Ht 5' 2\" (1.575 m)   Wt 109 lb (49.4 kg)   LMP 09/10/2024 (Exact Date)   SpO2 98%   BMI 19.94 kg/m²   GENERAL: well developed, well nourished,in no apparent distress  SKIN: no rashes,no suspicious lesions  HEAD: atraumatic, normocephalic.  No tenderness on palpation of  sinuses  EYES: conjunctiva clear, EOM intact  EARS: TM's gray, no bulging, no retraction,no fluid, bony landmarks visible  NOSE: Nostrils patent, clear nasal discharge, nasal mucosa pink   THROAT: Oral mucosa pink, moist. Posterior pharynx is not erythematous. no exudates. Tonsils 1/4. Uvula midline    NECK: Supple, non-tender  LUNGS: clear to auscultation bilaterally, no wheezes or rhonchi. Breathing is non labored.  CARDIO: RRR without murmur  EXTREMITIES: no cyanosis, clubbing or edema. Negative radha's sign bilaterally.   LYMPH:  no lymphadenopathy.        ASSESSMENT AND PLAN:   Lore Avila is a 19 year old adult who presents with upper respiratory symptoms that are consistent with    ASSESSMENT:   Encounter Diagnoses   Name Primary?    Viral URI with cough Yes    Patient  requested diagnostic testing        PLAN: Meds as below.  Comfort care as described in Patient Instructions    Meds & Refills for this Visit:  Requested Prescriptions      No prescriptions requested or ordered in this encounter     Rapid strep is negative. Low clinical suspicion for strep throat.     Offered covid testing. PT declined.     Suspect viral process. Continue otc medications and comfort care measures. Recommended use of albuterol inhaler as directed on packaging - pt reports has enough at home.     Discussed s/s of worsening infection/condition with Patient and importance of prompt medical re-evaluation including when to seek emergency care. Patient  voiced understanding    Increase fluids and rest.     May consider OTC tylenol or ibuprofen as needed and directed on packaging for pain/fever    May consider OTC guaifenesin as needed and directed on packaging to thin mucus secretions.    May consider OTC dextromethorphan as needed and directed on packaging as a cough suppressant     May consider OTC  pseudoephedrine as needed and directed on packaging as a nasal decongestant    May consider OTC Cepacol throat lozenges as needed and directed on packaging for sore throat.     May consider OTC saline nasal spray per box instructions    All questions and concerns addressed. Encouraged Patient  to call clinic with any questions or concerns. I explained to the patient that emergent conditions may arise and to go to the ER for new, worsening or any persistent conditions.    Patient Instructions   See attached patient care instructions.      The patient indicates understanding of these issues and agrees to the plan.  The patient is asked to return if sx's persist or worsen.

## 2024-10-18 ENCOUNTER — HOSPITAL ENCOUNTER (OUTPATIENT)
Age: 19
Discharge: HOME | End: 2024-10-18
Payer: COMMERCIAL

## 2024-10-18 VITALS
DIASTOLIC BLOOD PRESSURE: 60 MMHG | SYSTOLIC BLOOD PRESSURE: 95 MMHG | TEMPERATURE: 98 F | HEART RATE: 79 BPM | OXYGEN SATURATION: 99 % | RESPIRATION RATE: 18 BRPM

## 2024-10-18 DIAGNOSIS — N39.0 ACUTE URINARY TRACT INFECTION: Primary | ICD-10-CM

## 2024-10-18 LAB
B-HCG UR QL: NEGATIVE
BILIRUB UR QL STRIP: NEGATIVE
COLOR UR: YELLOW
GLUCOSE UR STRIP-MCNC: NEGATIVE MG/DL
KETONES UR STRIP-MCNC: NEGATIVE MG/DL
NITRITE UR QL STRIP: NEGATIVE
PH UR STRIP: 7 [PH]
PROT UR STRIP-MCNC: NEGATIVE MG/DL
SP GR UR STRIP: 1.01
UROBILINOGEN UR STRIP-ACNC: <2 MG/DL

## 2024-10-18 PROCEDURE — 87186 SC STD MICRODIL/AGAR DIL: CPT | Performed by: PHYSICIAN ASSISTANT

## 2024-10-18 PROCEDURE — 87086 URINE CULTURE/COLONY COUNT: CPT | Performed by: PHYSICIAN ASSISTANT

## 2024-10-18 PROCEDURE — 87077 CULTURE AEROBIC IDENTIFY: CPT | Performed by: PHYSICIAN ASSISTANT

## 2024-10-18 RX ORDER — CEFADROXIL 250 MG/5ML
500 POWDER, FOR SUSPENSION ORAL 2 TIMES DAILY
Qty: 140 ML | Refills: 0 | Status: SHIPPED | OUTPATIENT
Start: 2024-10-18 | End: 2024-10-25

## 2024-10-18 NOTE — DISCHARGE INSTRUCTIONS
Complete entire course of antibiotic as directed   Urine culture results in 1-2 days   Drink plenty of water and get plenty of rest   Follow up with your primary care provider     If you experience severe or worsening pain, fevers, vomiting, chills, flank pain, dizziness or weakness, or any other concerning symptoms, go to nearest ER immediately

## 2024-11-12 ENCOUNTER — LAB ENCOUNTER (OUTPATIENT)
Dept: LAB | Age: 19
End: 2024-11-12
Payer: COMMERCIAL

## 2024-11-12 ENCOUNTER — OFFICE VISIT (OUTPATIENT)
Dept: FAMILY MEDICINE CLINIC | Facility: CLINIC | Age: 19
End: 2024-11-12

## 2024-11-12 VITALS
HEART RATE: 92 BPM | WEIGHT: 109.38 LBS | RESPIRATION RATE: 16 BRPM | BODY MASS INDEX: 20.13 KG/M2 | SYSTOLIC BLOOD PRESSURE: 95 MMHG | HEIGHT: 62 IN | OXYGEN SATURATION: 98 % | DIASTOLIC BLOOD PRESSURE: 59 MMHG | TEMPERATURE: 98 F

## 2024-11-12 DIAGNOSIS — Z31.41 FERTILITY TESTING: ICD-10-CM

## 2024-11-12 DIAGNOSIS — F41.9 ANXIETY: ICD-10-CM

## 2024-11-12 DIAGNOSIS — J01.90 ACUTE NON-RECURRENT SINUSITIS, UNSPECIFIED LOCATION: ICD-10-CM

## 2024-11-12 DIAGNOSIS — Z31.41 FERTILITY TESTING: Primary | ICD-10-CM

## 2024-11-12 LAB
FSH SERPL-ACNC: 4.3 MIU/ML
LH SERPL-ACNC: 2.2 MIU/ML

## 2024-11-12 PROCEDURE — 83002 ASSAY OF GONADOTROPIN (LH): CPT

## 2024-11-12 PROCEDURE — 3008F BODY MASS INDEX DOCD: CPT

## 2024-11-12 PROCEDURE — 99213 OFFICE O/P EST LOW 20 MIN: CPT

## 2024-11-12 PROCEDURE — 83001 ASSAY OF GONADOTROPIN (FSH): CPT

## 2024-11-12 PROCEDURE — 3078F DIAST BP <80 MM HG: CPT

## 2024-11-12 PROCEDURE — 3074F SYST BP LT 130 MM HG: CPT

## 2024-11-12 PROCEDURE — 36415 COLL VENOUS BLD VENIPUNCTURE: CPT

## 2024-11-12 RX ORDER — AZITHROMYCIN 250 MG/1
TABLET, FILM COATED ORAL
Qty: 6 TABLET | Refills: 0 | Status: SHIPPED | OUTPATIENT
Start: 2024-11-12 | End: 2024-11-16

## 2024-11-12 RX ORDER — FLUTICASONE PROPIONATE 50 MCG
2 SPRAY, SUSPENSION (ML) NASAL DAILY
Qty: 16 G | Refills: 0 | Status: SHIPPED | OUTPATIENT
Start: 2024-11-12 | End: 2025-11-07

## 2024-11-12 RX ORDER — CLONAZEPAM 0.5 MG/1
0.5 TABLET, ORALLY DISINTEGRATING ORAL 2 TIMES DAILY PRN
Qty: 14 TABLET | Refills: 0 | Status: SHIPPED | OUTPATIENT
Start: 2024-11-12

## 2024-11-12 NOTE — PROGRESS NOTES
HPI:    Patient ID: Lore Avila is a 19 year old adult.    HPI     Patient here in office requesting hormonal testing to check fertility.  States sister currently having issues with fertility and she wants to make sure there is no hormonal imbalances that may hinder pregnancy in the future.  Takes oral contraceptive daily.  Reports regular menstrual cycles with birth control (typically last 3-4 days).      Also complains of nasal congestion, sinus pain/pressure, and postnasal drip, present for over 2 weeks.  Denies fever, cough, shortness of breath, or difficulty breathing.    Requesting refill of escitalopram, mildly helping with symptoms.  Interested in medication for breakthrough anxiety.      Review of Systems   Constitutional: Negative.  Negative for fever.   HENT:  Positive for congestion, postnasal drip, sinus pressure and sinus pain.    Respiratory: Negative.     Cardiovascular: Negative.    Genitourinary: Negative.  Negative for menstrual problem.   Skin: Negative.    Neurological: Negative.    Psychiatric/Behavioral:  The patient is nervous/anxious.             Current Outpatient Medications   Medication Sig Dispense Refill    clonazePAM 0.5 MG Oral Tablet Dispersible Take 1 tablet (0.5 mg total) by mouth 2 (two) times daily as needed. 14 tablet 0    azithromycin (ZITHROMAX Z-GIO) 250 MG Oral Tab Take 2 tablets (500 mg total) by mouth daily for 1 day, THEN 1 tablet (250 mg total) daily for 4 days. 6 tablet 0    fluticasone propionate 50 MCG/ACT Nasal Suspension 2 sprays by Each Nare route daily. 16 g 0    escitalopram 5 MG/5ML Oral Solution Take 5 mL (5 mg total) by mouth daily.      Norethin Ace-Eth Estrad-FE (JUNEL FE 1/20) 1-20 MG-MCG Oral Tab Take 1 tablet by mouth daily. 28 tablet 12    albuterol 108 (90 Base) MCG/ACT Inhalation Aero Soln Inhale 1 puff and hold breath for 10 seconds then release.  Wait 1 full minute and repeat for second puff.  Use every 4-6 hours as needed. 18 g 3      Allergies:Allergies[1]   BP 95/59   Pulse 92   Temp 98.2 °F (36.8 °C) (Temporal)   Resp 16   Ht 5' 2\" (1.575 m)   Wt 109 lb 6.4 oz (49.6 kg)   LMP 11/08/2024 (Approximate)   SpO2 98%   BMI 20.01 kg/m²   Body mass index is 20.01 kg/m².  PHYSICAL EXAM:   Physical Exam  Vitals reviewed.   Constitutional:       General: She is not in acute distress.     Appearance: Normal appearance. She is not ill-appearing.   HENT:      Right Ear: Tympanic membrane, ear canal and external ear normal. There is no impacted cerumen.      Left Ear: Tympanic membrane, ear canal and external ear normal. There is no impacted cerumen.      Nose: Congestion present.      Mouth/Throat:      Mouth: Mucous membranes are moist.      Pharynx: No oropharyngeal exudate or posterior oropharyngeal erythema.   Eyes:      General:         Right eye: No discharge.         Left eye: No discharge.      Conjunctiva/sclera: Conjunctivae normal.   Cardiovascular:      Rate and Rhythm: Normal rate and regular rhythm.      Heart sounds: Normal heart sounds. No murmur heard.     No friction rub. No gallop.   Pulmonary:      Effort: Pulmonary effort is normal. No respiratory distress.      Breath sounds: Normal breath sounds. No stridor. No wheezing, rhonchi or rales.   Chest:      Chest wall: No tenderness.   Musculoskeletal:      Cervical back: Neck supple.   Lymphadenopathy:      Cervical: No cervical adenopathy.   Skin:     General: Skin is warm.      Findings: No rash.   Neurological:      General: No focal deficit present.      Mental Status: She is alert and oriented to person, place, and time.   Psychiatric:         Mood and Affect: Mood normal.         Behavior: Behavior normal.         Thought Content: Thought content normal.         Judgment: Judgment normal.                ASSESSMENT/PLAN:   1. Fertility testing  - [facility] Fertility [E]; Future  - [facility]ertility [E]; Future  - Testosterone, Total And Free [E]; Future    2. Anxiety  - escitalopram  5 MG/5ML Oral Solution, Take 5 mL (5 mg total) by mouth daily.   - [facility] Referral - In Network  - clonazePAM 0.5 MG Oral Tablet Dispersible; Take 1 tablet (0.5 mg total) by mouth 2 (two) times daily as needed.  Dispense: 14 tablet; Refill: 0    3. Acute non-recurrent sinusitis, unspecified location  - azithromycin (ZITHROMAX Z-GIO) 250 MG Oral Tab, Take 2 tablets (500 mg total) by mouth daily for 1 day, THEN 1 tablet (250 mg total) daily for 4 days.       Orders Placed This Encounter   Procedures    [facility] Fertility [E]    [facility] Fertility [E]    Testosterone, Total And Free [E]       Meds This Visit:  Requested Prescriptions     Signed Prescriptions Disp Refills    clonazePAM 0.5 MG Oral Tablet Dispersible 14 tablet 0     Sig: Take 1 tablet (0.5 mg total) by mouth 2 (two) times daily as needed.    azithromycin (ZITHROMAX Z-GIO) 250 MG Oral Tab 6 tablet 0     Sig: Take 2 tablets (500 mg total) by mouth daily for 1 day, THEN 1 tablet (250 mg total) daily for 4 days.    fluticasone propionate 50 MCG/ACT Nasal Suspension 16 g 0     Si sprays by Each Nare route daily.       Imaging & Referrals:  OP REFERRAL TO [facility]         ID#[id number]         [1]   Allergies  Allergen Reactions    Penicillin G Benzathine RASH

## 2024-11-13 ENCOUNTER — LAB ENCOUNTER (OUTPATIENT)
Dept: LAB | Age: 19
End: 2024-11-13
Payer: COMMERCIAL

## 2024-11-13 DIAGNOSIS — Z31.41 FERTILITY TESTING: ICD-10-CM

## 2024-11-13 PROCEDURE — 84402 ASSAY OF FREE TESTOSTERONE: CPT

## 2024-11-13 PROCEDURE — 36415 COLL VENOUS BLD VENIPUNCTURE: CPT

## 2024-11-13 PROCEDURE — 84403 ASSAY OF TOTAL TESTOSTERONE: CPT

## 2024-11-17 LAB
FREE TESTOST DIRECT: 3.1 PG/ML
TESTOSTERONE: 25 NG/DL

## 2024-12-31 RX ORDER — NORETHINDRONE ACETATE AND ETHINYL ESTRADIOL 1MG-20(21)
1 KIT ORAL DAILY
Qty: 28 TABLET | Refills: 12 | OUTPATIENT
Start: 2024-12-31 | End: 2025-12-31

## 2025-01-13 ENCOUNTER — TELEPHONE (OUTPATIENT)
Dept: FAMILY MEDICINE CLINIC | Facility: CLINIC | Age: 20
End: 2025-01-13

## 2025-01-13 ENCOUNTER — OFFICE VISIT (OUTPATIENT)
Dept: FAMILY MEDICINE CLINIC | Facility: CLINIC | Age: 20
End: 2025-01-13

## 2025-01-13 VITALS
BODY MASS INDEX: 19.41 KG/M2 | OXYGEN SATURATION: 99 % | WEIGHT: 105.5 LBS | HEIGHT: 62 IN | SYSTOLIC BLOOD PRESSURE: 108 MMHG | DIASTOLIC BLOOD PRESSURE: 69 MMHG | TEMPERATURE: 98 F | HEART RATE: 77 BPM | RESPIRATION RATE: 16 BRPM

## 2025-01-13 DIAGNOSIS — N89.8 VAGINAL ODOR: Primary | ICD-10-CM

## 2025-01-13 DIAGNOSIS — Z11.3 SCREEN FOR STD (SEXUALLY TRANSMITTED DISEASE): ICD-10-CM

## 2025-01-13 DIAGNOSIS — R35.0 URINARY FREQUENCY: ICD-10-CM

## 2025-01-13 LAB
BILIRUBIN: NEGATIVE
GLUCOSE (URINE DIPSTICK): NEGATIVE MG/DL
KETONES (URINE DIPSTICK): NEGATIVE MG/DL
MULTISTIX LOT#: ABNORMAL NUMERIC
NITRITE, URINE: POSITIVE
PH, URINE: 5 (ref 4.5–8)
PROTEIN (URINE DIPSTICK): NEGATIVE MG/DL
SPECIFIC GRAVITY: 1.03 (ref 1–1.03)
UROBILINOGEN,SEMI-QN: 0.2 MG/DL (ref 0–1.9)

## 2025-01-13 PROCEDURE — 3074F SYST BP LT 130 MM HG: CPT

## 2025-01-13 PROCEDURE — 99213 OFFICE O/P EST LOW 20 MIN: CPT

## 2025-01-13 PROCEDURE — 81002 URINALYSIS NONAUTO W/O SCOPE: CPT

## 2025-01-13 PROCEDURE — 3008F BODY MASS INDEX DOCD: CPT

## 2025-01-13 PROCEDURE — 3078F DIAST BP <80 MM HG: CPT

## 2025-01-13 RX ORDER — NITROFURANTOIN ORAL SUSPENSION 25 MG/5ML
100 SUSPENSION ORAL 2 TIMES DAILY
Qty: 280 ML | Refills: 0 | Status: SHIPPED | OUTPATIENT
Start: 2025-01-13 | End: 2025-01-20

## 2025-01-13 RX ORDER — NITROFURANTOIN MACROCRYSTALS 100 MG/1
100 CAPSULE ORAL ONCE AS NEEDED
Qty: 30 CAPSULE | Refills: 0 | Status: SHIPPED | OUTPATIENT
Start: 2025-01-13 | End: 2025-01-13

## 2025-01-13 NOTE — TELEPHONE ENCOUNTER
I want the patient to take the liquid nitrofurantion for an acute UTI. Nitrofurantion also ordered as needed after sexual intercourse once patient done with script for acute UTI

## 2025-01-13 NOTE — PROGRESS NOTES
HPI:    Patient ID: Lore Avila is a 19 year old adult.    HPI     Patient here in office with complaint of vaginal odor/discharge.  Also reports mild menstrual bleeding.  Denies urinary frequency, urgency, or flank pain.  States she is prone to recurrent UTIs.  Interested in starting medication to prevent UTIs after sexual intercourse.        Review of Systems   Constitutional: Negative.    Respiratory: Negative.     Cardiovascular: Negative.    Genitourinary:  Positive for vaginal bleeding and vaginal discharge. Negative for flank pain, frequency, hematuria and urgency.   Skin: Negative.    Neurological: Negative.    Psychiatric/Behavioral: Negative.              Current Outpatient Medications   Medication Sig Dispense Refill    Nitrofurantoin 50 MG/10ML Oral Suspension Take 100 mg by mouth in the morning and 100 mg before bedtime. Do all this for 7 days. 280 mL 0    nitrofurantoin macrocrystal 100 MG Oral Cap Take 1 capsule (100 mg total) by mouth once as needed (after sexual activity). 30 capsule 0    escitalopram 5 MG/5ML Oral Solution Take 5 mL (5 mg total) by mouth daily. 150 mL 1    hydrOXYzine 10 MG Oral Tab Take 1-2 tablets by mouth daily as needed. 15 tablet 0    Norethin Ace-Eth Estrad-FE (JUNEL FE 1/20) 1-20 MG-MCG Oral Tab Take 1 tablet by mouth daily. 28 tablet 12    albuterol 108 (90 Base) MCG/ACT Inhalation Aero Soln Inhale 1 puff and hold breath for 10 seconds then release.  Wait 1 full minute and repeat for second puff.  Use every 4-6 hours as needed. 18 g 3     Allergies:Allergies[1]   /69   Pulse 77   Temp 98.2 °F (36.8 °C) (Temporal)   Resp 16   Ht 5' 2\" (1.575 m)   Wt 105 lb 8 oz (47.9 kg)   LMP 01/01/2025 (Approximate)   SpO2 99%   BMI 19.30 kg/m²   Body mass index is 19.3 kg/m².  PHYSICAL EXAM:   Physical Exam  Vitals reviewed.   Constitutional:       General: She is not in acute distress.     Appearance: She is not ill-appearing.   Pulmonary:      Effort: Pulmonary effort  is normal. No respiratory distress.   Genitourinary:     Comments: Vaginal discharge  Neurological:      General: No focal deficit present.      Mental Status: She is alert and oriented to person, place, and time.   Psychiatric:         Mood and Affect: Mood normal.         Behavior: Behavior normal.         Thought Content: Thought content normal.         Judgment: Judgment normal.                ASSESSMENT/PLAN:   1. Vaginal odor  - Vaginitis Vaginosis PCR Panel; Future  - Vaginitis Vaginosis PCR Panel    2. Urinary frequency  -Urinalysis dipstick completed and significant for UTI  - Nitrofurantoin 50 MG/10ML Oral Suspension,  Take 100 mg by mouth in the morning and 100 mg before bedtime. Do all this for 7 days.   - nitrofurantoin macrocrystal 100 MG Oral Cap,  Take 1 capsule (100 mg total) by mouth once as needed (after sexual activity).   - Urine Culture, Routine; Future  - Urine Culture, Routine    3. Screen for STD (sexually transmitted disease)  - Chlamydia/Gc Amplification [E]; Future  - Chlamydia/Gc Amplification [E]      Orders Placed This Encounter   Procedures    POC Urinalysis, Manual Dip without microscopy [[id number]]    Urine Culture, Routine    Vaginitis Vaginosis PCR Panel    Chlamydia/Gc Amplification [E]       Meds This Visit:  Requested Prescriptions     Signed Prescriptions Disp Refills    Nitrofurantoin 50 MG/10ML Oral Suspension 280 mL 0     Sig: Take 100 mg by mouth in the morning and 100 mg before bedtime. Do all this for 7 days.    nitrofurantoin macrocrystal 100 MG Oral Cap 30 capsule 0     Sig: Take 1 capsule (100 mg total) by mouth once as needed (after sexual activity).       Imaging & Referrals:  None         ID#[id number]         [1]   Allergies  Allergen Reactions    Penicillin G Benzathine RASH

## 2025-01-13 NOTE — TELEPHONE ENCOUNTER
Pharmacist called regarding the following medication:    Nitrofurantoin 50 MG/10ML Oral Suspension     nitrofurantoin macrocrystal 100 MG Oral Cap       She stated they had two prescriptions sent and they want to verify which one if correct.

## 2025-01-13 NOTE — TELEPHONE ENCOUNTER
Called  [facility] pharmacy verified patient name and date of birth.  Answered pharmacist's questions as per Milana Zhang's 1/13/25 note below.   Pharmacist has no further questions.

## 2025-01-14 LAB
BV BACTERIA DNA VAG QL NAA+PROBE: NEGATIVE
C GLABRATA DNA VAG QL NAA+PROBE: NEGATIVE
C KRUSEI DNA VAG QL NAA+PROBE: NEGATIVE
C TRACH DNA SPEC QL NAA+PROBE: NEGATIVE
CANDIDA DNA VAG QL NAA+PROBE: POSITIVE
N GONORRHOEA DNA SPEC QL NAA+PROBE: NEGATIVE
T VAGINALIS DNA VAG QL NAA+PROBE: NEGATIVE

## 2025-01-15 ENCOUNTER — PATIENT MESSAGE (OUTPATIENT)
Dept: FAMILY MEDICINE CLINIC | Facility: CLINIC | Age: 20
End: 2025-01-15

## 2025-01-16 NOTE — TELEPHONE ENCOUNTER
Addressed at telephone encounter on 1/13/25    Instructed the patient to call us with any symptoms for it can take up to 3 days to address a mychart message with understanding.

## 2025-02-13 ENCOUNTER — OFFICE VISIT (OUTPATIENT)
Dept: FAMILY MEDICINE CLINIC | Facility: CLINIC | Age: 20
End: 2025-02-13

## 2025-02-13 VITALS
HEIGHT: 62 IN | BODY MASS INDEX: 19.88 KG/M2 | HEART RATE: 90 BPM | TEMPERATURE: 97 F | WEIGHT: 108 LBS | SYSTOLIC BLOOD PRESSURE: 95 MMHG | DIASTOLIC BLOOD PRESSURE: 60 MMHG

## 2025-02-13 DIAGNOSIS — B96.89 UTI DUE TO KLEBSIELLA SPECIES: ICD-10-CM

## 2025-02-13 DIAGNOSIS — N39.0 RECURRENT UTI: Primary | ICD-10-CM

## 2025-02-13 DIAGNOSIS — R31.0 GROSS HEMATURIA: ICD-10-CM

## 2025-02-13 DIAGNOSIS — N39.0 UTI DUE TO KLEBSIELLA SPECIES: ICD-10-CM

## 2025-02-13 DIAGNOSIS — Z87.442 HISTORY OF NEPHROLITHIASIS: ICD-10-CM

## 2025-02-13 LAB
APPEARANCE: CLEAR
BILIRUBIN: NEGATIVE
GLUCOSE (URINE DIPSTICK): NEGATIVE MG/DL
KETONES (URINE DIPSTICK): NEGATIVE MG/DL
MULTISTIX LOT#: ABNORMAL NUMERIC
NITRITE, URINE: NEGATIVE
PH, URINE: 6.5 (ref 4.5–8)
PROTEIN (URINE DIPSTICK): NEGATIVE MG/DL
SPECIFIC GRAVITY: 1.01 (ref 1–1.03)
URINE-COLOR: YELLOW
UROBILINOGEN,SEMI-QN: 0.2 MG/DL (ref 0–1.9)

## 2025-02-13 PROCEDURE — 3074F SYST BP LT 130 MM HG: CPT | Performed by: FAMILY MEDICINE

## 2025-02-13 PROCEDURE — 3008F BODY MASS INDEX DOCD: CPT | Performed by: FAMILY MEDICINE

## 2025-02-13 PROCEDURE — 3078F DIAST BP <80 MM HG: CPT | Performed by: FAMILY MEDICINE

## 2025-02-13 PROCEDURE — 81002 URINALYSIS NONAUTO W/O SCOPE: CPT | Performed by: FAMILY MEDICINE

## 2025-02-13 PROCEDURE — 99214 OFFICE O/P EST MOD 30 MIN: CPT | Performed by: FAMILY MEDICINE

## 2025-02-13 RX ORDER — LEVOFLOXACIN 250 MG/1
250 TABLET, FILM COATED ORAL DAILY
Qty: 7 TABLET | Refills: 0 | Status: SHIPPED | OUTPATIENT
Start: 2025-02-13 | End: 2025-02-20

## 2025-02-13 RX ORDER — NITROFURANTOIN MACROCRYSTALS 100 MG/1
CAPSULE ORAL
COMMUNITY
Start: 2025-01-13 | End: 2025-02-13

## 2025-02-13 RX ORDER — FLUCONAZOLE 150 MG/1
TABLET ORAL
COMMUNITY
Start: 2025-01-15 | End: 2025-02-13

## 2025-02-13 NOTE — PROGRESS NOTES
HPI:    Patient ID: Lore Avila is a 19 year old adult.      HPI    Chief Complaint   Patient presents with    Follow - Up     On UTI and Yeast Infection states antibiotics did not helped did not completed her antibiotics states she gets phobia when taking capsules has to crush pills has tried antibiotics in liquid format but makes her throw up. Still having some blood in her urine 2 days ago also a red discharge that started yesterday and stomach cramping.        Wt Readings from Last 6 Encounters:   02/13/25 108 lb (49 kg)   01/13/25 105 lb 8 oz (47.9 kg)   11/12/24 109 lb 6.4 oz (49.6 kg)   10/14/24 109 lb (49.4 kg)   10/05/24 109 lb (49.4 kg)   09/23/24 109 lb 6 oz (49.6 kg)     BP Readings from Last 3 Encounters:   02/13/25 95/60   01/13/25 108/69   11/12/24 95/59     Patient is tired of having utis.  Has had 5-6 in last year.  Hx of kidney stones - twice.    Normal kidney bladder us.    Had a recent UTI  Urine culture positive 1/16/25  > klebsiella pneumoniae, 50,000 - 99,000 Klebsiella Pneumoniae  Was given macrobid but made her vomit and then took pills for 4-5 days  Didn't complete prescription      No fevers, no chills  No nausea/ vomiting     Has not seen urology for few years    Review of Systems   Constitutional:  Negative for chills and fever.   Gastrointestinal:  Negative for abdominal pain, constipation, diarrhea, nausea and vomiting.   Genitourinary:  Positive for hematuria and pelvic pain. Negative for difficulty urinating, dysuria, flank pain and frequency.       BP 95/60   Pulse 90   Temp 97.4 °F (36.3 °C) (Temporal)   Ht 5' 2\" (1.575 m)   Wt 108 lb (49 kg)   LMP 01/30/2025 (Exact Date)   BMI 19.75 kg/m²          Current Outpatient Medications   Medication Sig Dispense Refill    levoFLOXacin (LEVAQUIN) 250 MG Oral Tab Take 1 tablet (250 mg total) by mouth daily for 7 days. 7 tablet 0    Norethin Ace-Eth Estrad-FE (JUNEL FE 1/20) 1-20 MG-MCG Oral Tab Take 1 tablet by mouth daily. 28  tablet 12    albuterol 108 (90 Base) MCG/ACT Inhalation Aero Soln Inhale 1 puff and hold breath for 10 seconds then release.  Wait 1 full minute and repeat for second puff.  Use every 4-6 hours as needed. 18 g 3     Allergies:Allergies[1]   PHYSICAL EXAM:     Chief Complaint   Patient presents with    Follow - Up     On UTI and Yeast Infection states antibiotics did not helped did not completed her antibiotics states she gets phobia when taking capsules has to crush pills has tried antibiotics in liquid format but makes her throw up. Still having some blood in her urine 2 days ago also a red discharge that started yesterday and stomach cramping.       Physical Exam  Vitals reviewed.   Constitutional:       Appearance: Normal appearance. She is not ill-appearing.   Cardiovascular:      Rate and Rhythm: Normal rate and regular rhythm.      Pulses: Normal pulses.      Heart sounds: Normal heart sounds.   Pulmonary:      Effort: Pulmonary effort is normal.      Breath sounds: Normal breath sounds.   Abdominal:      General: There is no distension.      Palpations: There is no mass.      Tenderness: There is abdominal tenderness. There is no right CVA tenderness, left CVA tenderness, guarding or rebound.      Hernia: No hernia is present.      Comments: Left sided abdominal tenderness   Neurological:      Mental Status: She is alert.                ASSESSMENT/PLAN:     Encounter Diagnoses   Name Primary?    Recurrent UTI Yes    Gross hematuria     UTI due to Klebsiella species     History of nephrolithiasis        1. Recurrent UTI  Reviewed previous urine cultures   - UROLOGY - INTERNAL  - POC Urinalysis, Manual Dip without microscopy [[id number]]  - Urine Culture, Routine [E]; Future    2. Gross hematuria  Patient reluctant about CT  To see urology   - UROLOGY - INTERNAL  - POC Urinalysis, Manual Dip without microscopy [[id number]]  - Urine Culture, Routine [E]; Future    3. UTI due to Klebsiella species    - levoFLOXacin  (LEVAQUIN) 250 MG Oral Tab; Take 1 tablet (250 mg total) by mouth daily for 7 days.  Dispense: 7 tablet; Refill: 0    4. History of nephrolithiasis    If symptoms worsen, pain, fever, bloody urine persists recommend ER     Orders Placed This Encounter   Procedures    POC Urinalysis, Manual Dip without microscopy [[id number]]    Urine Culture, Routine [E]         The above note was creating using Dragon speech recognition technology. Please excuse any typos    Meds This Visit:  Requested Prescriptions     Signed Prescriptions Disp Refills    levoFLOXacin (LEVAQUIN) 250 MG Oral Tab 7 tablet 0     Sig: Take 1 tablet (250 mg total) by mouth daily for 7 days.       Imaging & Referrals:  UROLOGY - INTERNAL       ID#[id number]         [1]   Allergies  Allergen Reactions    Penicillin G Benzathine RASH

## 2025-02-19 ENCOUNTER — OFFICE VISIT (OUTPATIENT)
Dept: SURGERY | Facility: CLINIC | Age: 20
End: 2025-02-19

## 2025-02-19 DIAGNOSIS — N39.0 RECURRENT UTI: Primary | ICD-10-CM

## 2025-02-19 PROCEDURE — 99204 OFFICE O/P NEW MOD 45 MIN: CPT | Performed by: UROLOGY

## 2025-02-19 NOTE — PROGRESS NOTES
Kristina Bee MD  Department of Urology  [address]  [place] [address]    T: [phone]  F: [phone]    To: Jenaro Jaquez MD   [address] [facility] [facility] [address]    Re: Lore Avila   MRN: [id number]  : 2005    Dear Jenaro Jaquez MD,    Today I had the pleasure of seeing Lore Avila in my clinic. As you know, Lore Avila is a pleasant 19 year old year old adult who I am seeing for recurrent uti. Patient was last seen in this department on Visit date not found.    Briefly, patient states that they have had recurrent urinary tract infections.  Per my chart review they have had Klebsiella, E. coli on numerous occasions since .  THey had an ultrasound of her kidneys that demonstrated no abnormalities.  This was done in 2024       PAST MEDICAL HISTORY:  Past Medical History:    Extrinsic asthma, unspecified    History of UTI    Nephrolithiasis    Scoliosis        PAST SURGICAL HISTORY:  Past Surgical History:   Procedure Laterality Date    Other surgical history  10/30/2021    Lt RPG, Lt URS, Lt stent insertion  - Dr. Hearn         ALLERGIES:  Allergies[1]      MEDICATIONS:  Current Outpatient Medications   Medication Instructions    albuterol 108 (90 Base) MCG/ACT Inhalation Aero Soln Inhale 1 puff and hold breath for 10 seconds then release.  Wait 1 full minute and repeat for second puff.  Use every 4-6 hours as needed.    levoFLOXacin (LEVAQUIN) 250 mg, Oral, Daily    Norethin Ace-Eth Estrad-FE (JUNEL FE 1/20) 1-20 MG-MCG Oral Tab 1 tablet, Oral, Daily        FAMILY HISTORY:  Family History   Problem Relation Age of Onset    No Known Problems Father     Cancer Paternal Grandmother     Alcohol abuse Paternal Grandfather     Suicide History Maternal Cousin 19        crashed car into a tree    Depression Cousin     Other (heart and lung transplant) Paternal Uncle     Diabetes Neg     Hypertension Neg     Heart Disorder Neg         SOCIAL HISTORY:  Social  History     Socioeconomic History    Marital status: Single   Tobacco Use    Smoking status: Never     Passive exposure: Yes    Smokeless tobacco: Never   Vaping Use    Vaping status: Former   Substance and Sexual Activity    Alcohol use: Yes     Comment: rarely at family partie - takes a sip.    Drug use: Not Currently     Types: Cannabis     Comment: a month and 1/2  ago smoked cannabis.  Used a couple puffs once a week.   Other Topics Concern    Caffeine Concern Yes     Comment: rarely soda    Second-hand smoke exposure No    Alcohol/drug concerns No    Violence concerns No          PHYSICAL EXAMINATION:  There were no vitals filed for this visit.  CONSTITUTIONAL: No apparent distress, cooperative and communicative  NEUROLOGIC: Alert and oriented   HEAD: Normocephalic, atraumatic   EYES: Sclera non-icteric   ENT: Hearing intact, moist mucous membranes   NECK: No obvious goiter or masses   RESPIRATORY: Normal respiratory effort, Nonlabored breathing on room air  SKIN: No evident rashes   ABDOMEN: Soft, nontender, nondistended, no rebound tenderness, no guarding, no masses    REVIEW OF SYSTEMS:    A comprehensive 10-point review of systems was completed.  Pertinent positives and negatives are noted in the the HPI.       LABORATORY DATA:  URINE CULTURE >100,000 CFU/ML Escherichia coli Abnormal         Resulting Agency: [facility] ([facility])     Susceptibility     Escherichia coli     Not Specified    Amikacin <=2 Sensitive    Ampicillin >=32 Resistant    Ampicillin + Sulbactam 16 Intermediate    Cefazolin <=4 Sensitive    Ciprofloxacin <=0.25 Sensitive    Gentamicin >=16 Resistant    Levofloxacin <=0.12 Sensitive    Meropenem <=0.25 Sensitive    Nitrofurantoin <=16 Sensitive    Piperacillin + Tazobactam <=4 Sensitive    Tobramycin 8 Intermediate    Trimethoprim/Sulfa >=320 Resistant              Linear View      0 Result Notes       1 Patient Communication  URINE CULTURE 50,000-99,000 CFU/ML Klebsiella pneumoniae  Abnormal         Resulting Agency: [facility] ([facility])     Susceptibility     Klebsiella pneumoniae     Not Specified    Ampicillin >=32 Resistant    Ampicillin + Sulbactam 4 Sensitive    Cefazolin <=4 Sensitive    Ciprofloxacin <=0.25 Sensitive    Gentamicin <=1 Sensitive    Levofloxacin <=0.12 Sensitive    Meropenem <=0.25 Sensitive    Nitrofurantoin 32 Sensitive    Piperacillin + Tazobactam <=4 Sensitive    Trimethoprim/Sulfa <=20 Sensitive         URINE CULTURE >100,000 CFU/ML Klebsiella pneumoniae Abnormal    50,000-99,000 CFU/ML Klebsiella pneumoniae Abnormal    Strain 2        Resulting Agency: [facility] ([facility])     Susceptibility     Klebsiella pneumoniae Klebsiella pneumoniae     Not Specified Not Specified    Ampicillin >=32 Resistant >=32 Resistant    Ampicillin + Sulbactam 4 Sensitive 4 Sensitive    Cefazolin <=4 Sensitive <=4 Sensitive    Ciprofloxacin <=0.25 Sensitive <=0.25 Sensitive    Gentamicin <=1 Sensitive <=1 Sensitive    Levofloxacin <=0.12 Sensitive <=0.12 Sensitive    Meropenem <=0.25 Sensitive <=0.25 Sensitive    Nitrofurantoin 64 Intermediate 64 Intermediate    Piperacillin + Tazobactam <=4 Sensitive <=4 Sensitive    Trimethoprim/Sulfa <=20 Sensitive <=20 Sensitive                Linear View                IMAGING REVIEW:  Narrative   PROCEDURE:  US KIDNEY/BLADDER (CPT=76770)     COMPARISON:  None.     INDICATIONS:  Z87.442 History of nephrolithiasis N39.0 Recurrent UTI     TECHNIQUE:  Transabdominal gray scale ultrasound imaging of the bilateral kidneys and bladder was performed.  Routine technique was utilized.       PATIENT STATED HISTORY: (As transcribed by Technologist)           FINDINGS:      RIGHT KIDNEY  MEASUREMENTS:  9.3 x 4.3 x 2.8 cm  ECHOGENICITY:  Normal.  HYDRONEPHROSIS:  None.  CYSTS/STONES/MASSES:  None.     LEFT KIDNEY  MEASUREMENTS:  10.4 x 4.0 x 3.7 cm  ECHOGENICITY:  Normal.  HYDRONEPHROSIS:  None.  CYSTS/STONES/MASSES:  None.     BLADDER:  Normal.  OTHER:  Negative.                    Impression   CONCLUSION:  Normal appearance of the kidneys.  Urinary bladder shows no abnormalities.        LOCATION:  [id number]              Dictated by (CST): Blane Lorenzo MD on 1/29/2024 at 4:12 PM      Finalized by (CST): Blane Lorenzo MD on 1/29/2024 at 4:12 PM       Narrative   PROCEDURE:   CT ABDOMEN + PELVIS KIDNEYSTONE 2D RNDR (NO IV NO ORAL) (CPT=74176)     COMPARISON: [facility], CT ABDOMEN + PELVIS KIDNEYSTONE 2D RNDR (NO IV NO ORAL) (CPT=741, 10/11/2021, 6:56 PM.     INDICATIONS: Lower back pain, lower abdominal/left flank pain. History of kidney stones.     TECHNIQUE:   CT images of the abdomen and pelvis were obtained without intravenous contrast material.  Automated exposure control for dose reduction was used. Adjustment of the mA and/or kV was done based on the patient's size. Use of iterative  reconstruction technique for dose reduction was used. Dose information is transmitted to the ACR (American College of Radiology) NRDR (National Radiology Data Registry) which includes the Dose Index Registry.        FINDINGS:  LOWER CHEST: The lung bases are clear.  The heart is within normal limits of size.     HEPATOBILIARY:   No enlargement, atrophy, abnormal density, or significant focal lesion. The gallbladder is unremarkable. No intrahepatic or extrahepatic biliary ductal dilatation.     SPLEEN:   No enlargement or focal lesion.       PANCREAS:   No lesion, fluid collection, ductal dilatation, or atrophy.       ADRENALS:   No mass or enlargement.       GENITOURINARY:   There are multiple nonobstructing bilateral renal calculi with the largest in the right midpole measuring 3 mm and the largest in the left midpole measuring 3 mm.  There is redemonstrated increased density at the right corticomedullary  junction a new increased density involving the left corticomedullary junction.  No hydronephrosis.  No ureteral or bladder calculus.  The bladder is  unremarkable.     GI TRACT:   No bowel obstruction.  No abnormal bowel wall thickening.  The appendix is unremarkable.  No acute appendicitis.     PELVIC ORGANS: The uterus is retroverted.     AORTA/VASCULAR:   No aneurysm.  The left gonadal vein is again noted to be prominent.     RETROPERITONEUM:   No mass or enlarged adenopathy.       PERITONEUM: No pneumoperitoneum or ascites.     LYMPH NODES: No evidence of lymphadenopathy.       BONES: No acute fracture. No aggressive osseous lesion.  There is levocurvature of the lower spine, which may be positional.  There are scattered degenerative changes of the lower thoracic and lumbar spine.     OTHER:   Negative.                    Impression   CONCLUSION:     1. Multiple bilateral non-obstructing renal calculi measuring up to 3 mm.  2. Increased density at the bilateral corticomedullary junctions , which can be seen in the setting of medullary nephrocalcinosis.  3. No hydronephrosis.  Unremarkable appearance of the bladder.          Dictated by (CST): Tony Simons MD on 10/23/2023 at 12:51 PM      Finalized by (CST): Tony Simons MD on 10/23/2023 at 12:57 PM              OTHER RELEVANT DATA:   none     IMPRESSION: Recurrent urinary tract infections with multiple nonobstructing stones seen on CT scan from 2023.  Will plan for behavioral management and CT abdomen pelvis to better characterize stone burden as it has been over a year since she has had evaluation of her stone burden.  It is reassuring that her ultrasound in January 2024 was unremarkable.  I did offer her a cystoscopy.    We talked about UTI prevention with continuing good hydration, starting a women's probiotic (bottle should say women's, vaginal, genitourinary; main ingredient should be lactobacillus), Cranberry pills (Ellura, Utiva, Crancap -all are found on [employer] on their respective website; they should have 36 mg PAC), bowel regimen (colace, senna, miralax), voiding before and after sexual  activity and using pH balanced soaps. Can continue to check urine and treat when UCx is positive. If this persists,  can consider initiating low dose antibiotic prophylaxis for 6 months versus gentamicin irrigations if she would like a more local therapy.      Continuous antimicrobial prophylaxis regimens for women with recurrent UTIs have been recommended by several trials.  The dosing options for continuous prophylaxis includes the following:    TMP 100mg once daily  TMO-SMX 40mg/200mg once daily  TMP-SMX 40mg 200mg thrice weekly  Nitrofurantoin monohydrate/macrocrystals 50mg daily  Nitrofurantoin onohydrate/macrocrystals 100mg daily  Cephalexin 125mg once daily  Cephalexin 250mg once daily  Fosfomycin 3g every 10 days     These regimens can continue for 3 to 6 months.     Recommended instructions for antibiotic prophylaxis related to sexual intercourse include taking a single dose of antibiotic immediately before or after sexual intercourse.  Dosing options for prophylaxis includes the following:     TMP-SMX 40mg/200mg  TMP-SMX 80mg/400mg  Nitrofurantoin monohydrate/macrocrystals 50mg - 100mg  Cephalexin 250mg           PLAN:  Behavioral management  CT abdomen and pelvis  Return to clinic after CT abdomen pelvis  cystoscopy    Thank you for referring this very pleasant patient to my clinic. If you have any questions or concerns, please do not hesitate to contact me.    Sincerely,  Kristina Bee MD    30 minutes were spent on this patient at this visit obtaining a history, reviewing medical records, developing a treatment plan, counseling and discussing treatment strategy with patient, coordination of care and documentation.     The 21st Century Cures Act makes medical notes available to patients in the interest of transparency.  However, please be advised that this is a medical document.  It is intended as a peer to peer communication.  It is written in medical language and may contain abbreviations or  verbiage that are technical and unfamiliar.  It may appear blunt or direct.  Medical documents are intended to carry relevant information, facts as evident, and the clinical opinion of the practitioner.         [1]   Allergies  Allergen Reactions    Penicillin G Benzathine RASH

## 2025-03-03 ENCOUNTER — PROCEDURE (OUTPATIENT)
Dept: SURGERY | Facility: CLINIC | Age: 20
End: 2025-03-03

## 2025-03-03 VITALS — DIASTOLIC BLOOD PRESSURE: 71 MMHG | SYSTOLIC BLOOD PRESSURE: 103 MMHG | HEART RATE: 85 BPM

## 2025-03-03 DIAGNOSIS — N39.0 RECURRENT UTI: Primary | ICD-10-CM

## 2025-03-03 NOTE — PROCEDURES
CYSTOSCOPY     PRE-OP DIAGNOSIS: Santi    POST-OP DIAGNOSIS: same    PROCEDURE: Cystsocopy    SURGEON: Kristina Bee MD    ASSISTANT: none     EBL: minimal    FINDINGS:   Urethra: No urethral lesions, no urethral strictures  Bladder: Bilateral ureteral orifices in orthotopic position with efflux, no suspicious or concerning erythematous lesions, no papillary bladder tumors, no stones   Retroflexion: no abnormalities   Other findings: none    INDICATIONS: SANTI    PROCEDURE: Patient was brought to the procedure suite and a timeout was performed identifying the patient,  and procedure being performed.  The risks of the procedure were once again detailed to the patient including bleeding, infection, dysuria.  The patient agreed to proceed.  The patient had a negative urinalysis.  No antibiotics were given to patient prior to this procedure.     She was placed in a supine position on the table and a flexible cystoscope was inserted per urethra.  There were no obvious urethral lesions or strictures. Once in the bladder we performed a full diagnostic/surveillance cystoscopy which demonstrated no abnormalities.  On retroflexion we noted no abnormalities.  The scope was then carefully removed and once again no urethral abnormalities were noted.    There were no complications after this procedure and the patient tolerated the procedure without issue.    IMPRESSION: cysto negative. CT pending    PLAN:    CT - will message her with findings, or she can see me in clinic to discuss

## 2025-03-31 ENCOUNTER — OFFICE VISIT (OUTPATIENT)
Dept: FAMILY MEDICINE CLINIC | Facility: CLINIC | Age: 20
End: 2025-03-31
Payer: COMMERCIAL

## 2025-03-31 VITALS
SYSTOLIC BLOOD PRESSURE: 104 MMHG | WEIGHT: 108 LBS | HEIGHT: 62 IN | DIASTOLIC BLOOD PRESSURE: 62 MMHG | OXYGEN SATURATION: 99 % | TEMPERATURE: 98 F | HEART RATE: 81 BPM | BODY MASS INDEX: 19.88 KG/M2 | RESPIRATION RATE: 16 BRPM

## 2025-03-31 DIAGNOSIS — K52.9 GASTROENTERITIS, ACUTE: ICD-10-CM

## 2025-03-31 DIAGNOSIS — N30.00 ACUTE CYSTITIS WITHOUT HEMATURIA: Primary | ICD-10-CM

## 2025-03-31 DIAGNOSIS — R39.9 URINARY SYMPTOM OR SIGN: ICD-10-CM

## 2025-03-31 LAB
BILIRUBIN: NEGATIVE
GLUCOSE (URINE DIPSTICK): NEGATIVE MG/DL
KETONES (URINE DIPSTICK): NEGATIVE MG/DL
MULTISTIX LOT#: ABNORMAL NUMERIC
NITRITE, URINE: POSITIVE
PH, URINE: 7.5 (ref 4.5–8)
SPECIFIC GRAVITY: 1.02 (ref 1–1.03)
URINE-COLOR: YELLOW
UROBILINOGEN,SEMI-QN: 4 MG/DL (ref 0–1.9)

## 2025-03-31 PROCEDURE — 87186 SC STD MICRODIL/AGAR DIL: CPT | Performed by: FAMILY MEDICINE

## 2025-03-31 PROCEDURE — 87086 URINE CULTURE/COLONY COUNT: CPT | Performed by: FAMILY MEDICINE

## 2025-03-31 PROCEDURE — 87077 CULTURE AEROBIC IDENTIFY: CPT | Performed by: FAMILY MEDICINE

## 2025-03-31 RX ORDER — LEVOFLOXACIN 250 MG/1
250 TABLET, FILM COATED ORAL DAILY
Qty: 7 TABLET | Refills: 0 | Status: SHIPPED | OUTPATIENT
Start: 2025-03-31 | End: 2025-04-07

## 2025-03-31 RX ORDER — ONDANSETRON 4 MG/1
4 TABLET, ORALLY DISINTEGRATING ORAL EVERY 8 HOURS PRN
Qty: 10 TABLET | Refills: 1 | Status: SHIPPED | OUTPATIENT
Start: 2025-03-31

## 2025-03-31 NOTE — PROGRESS NOTES
Lore Avila is a 19 year old adult.  Chief Complaint   Patient presents with    UTI     Sx 2 weeks - Dysuria, L lower abdominal pressure, L sided back pain, cloudy urine, dark urine, white creamy discharge, burning  Denies frequency, urgency, hematuria, itching  No OTC    Nausea/Vomiting/Diarrhea     Sx last week (went away) - L ear pain/pressure, ST  Sx Saturday - Nausea, vomiting (5 episodes), diarrhea (3-4 episodes), general headache, dizziness, chest tightness, fever (H of 100.4), chills, body aches, runny nose  Denies cough, chest congestion, nasal congestion, PND, rash  No Covid test was done at home  OTC Tylenol       HPI:   Patient presents with symptoms of UTI since 2 weeks ago.  Complaining of urinary frequency, urgency, dysuria, mild suprapubic pain.   Denies back pain, fever, hematuria.  Pt has frequent history of UTI in past.      Pt had stomach flu last week which seems to be resolving. Still with mild nausea, but no diarrhea or vomiting.   Current Outpatient Medications   Medication Sig Dispense Refill    levoFLOXacin (LEVAQUIN) 250 MG Oral Tab Take 1 tablet (250 mg total) by mouth daily for 7 days. 7 tablet 0    ondansetron 4 MG Oral Tablet Dispersible Take 1 tablet (4 mg total) by mouth every 8 (eight) hours as needed for Nausea. 10 tablet 1    Norethin Ace-Eth Estrad-FE (JUNEL FE 1/20) 1-20 MG-MCG Oral Tab Take 1 tablet by mouth daily. 28 tablet 12    albuterol 108 (90 Base) MCG/ACT Inhalation Aero Soln Inhale 1 puff and hold breath for 10 seconds then release.  Wait 1 full minute and repeat for second puff.  Use every 4-6 hours as needed. 18 g 3     No current facility-administered medications for this visit.      Past Medical History:    Extrinsic asthma, unspecified    History of UTI    Nephrolithiasis    Scoliosis      Social History:  Social History     Socioeconomic History    Marital status: Single   Tobacco Use    Smoking status: Never     Passive exposure: Yes    Smokeless tobacco:  Never   Vaping Use    Vaping status: Former   Substance and Sexual Activity    Alcohol use: Yes     Comment: rarely at family partie - takes a sip.    Drug use: Not Currently     Types: Cannabis     Comment: a month and 1/2  ago smoked cannabis.  Used a couple puffs once a week.   Other Topics Concern    Caffeine Concern Yes     Comment: rarely soda    Second-hand smoke exposure No    Alcohol/drug concerns No    Violence concerns No         REVIEW OF SYSTEMS:   GENERAL HEALTH: no fever/chills or fatigue  SKIN: denies any unusual skin lesions or rashes  RESPIRATORY: no shortness of breath with exertion  CARDIOVASCULAR: denies chest pain on exertion and palpitations.  GI: no nausea or vomiting  : as above.  NEURO: denies headaches or dizziness.    EXAM:   /62   Pulse 81   Temp 98.2 °F (36.8 °C) (Tympanic)   Resp 16   Ht 5' 2\" (1.575 m)   Wt 108 lb (49 kg)   LMP 03/25/2025 (Exact Date)   SpO2 99%   BMI 19.75 kg/m²   GENERAL: well developed, well nourished,in no apparent distress, pleasant pt.  SKIN: no rashes,no suspicious lesions  LUNG: Clear to auscultation bilaterally. No W/R/R.  HEART: RRR, no murmur.  GI: normoactive BS x4, no masses, HSM;  mild suprapubic tenderness, no CVAT    RESULTS:      Recent Results (from the past 24 hours)   URINALYSIS, AUTO, W/O SCOPE    Collection Time: 03/31/25  2:03 PM   Result Value Ref Range    Glucose Urine Negative Negative mg/dL    Bilirubin Urine Negative Negative    Ketones, UA Negative Negative - Trace mg/dL    Spec Gravity 1.020 1.005 - 1.030    Blood Urine Trace-intact (A) Negative    PH Urine 7.5 5.0 - 8.0    Protein Urine Trace Negative - Trace mg/dL    Urobilinogen Urine 4.0 (A) 0.2 - 1.0 mg/dL    Nitrite Urine Positive (A) Negative    Leukocyte Esterase Urine Trace (A) Negative    APPEARANCE Cloudy Clear    Color Urine Yellow Yellow    Multistix Lot# [id number] Numeric    Multistix Expiration Date 9/30/25 Date       ASSESSMENT AND PLAN:     Encounter  Diagnoses   Name Primary?    Acute cystitis without hematuria Yes    Urinary symptom or sign     Gastroenteritis, acute        Orders Placed This Encounter   Procedures    URINALYSIS, AUTO, W/O SCOPE    Urine Culture, Routine [E]       Meds & Refills for this Visit:  Requested Prescriptions     Signed Prescriptions Disp Refills    levoFLOXacin (LEVAQUIN) 250 MG Oral Tab 7 tablet 0     Sig: Take 1 tablet (250 mg total) by mouth daily for 7 days.    ondansetron 4 MG Oral Tablet Dispersible 10 tablet 1     Sig: Take 1 tablet (4 mg total) by mouth every 8 (eight) hours as needed for Nausea.         Patient Instructions   Take antibiotics with food and plenty of water.  Eat yogurt daily or use probiotics.  Make sure to finish the entire antibiotic treatment.  We will send the urine specimen for culture and call you in 2-3 days with results  You may take otc pyridium for urinary discomfort if needed.   Increase fluid intake and bladder emptying.  Return in 3 days if not better. Call if fever, vomiting, worsening symptoms.    The patient indicates understanding of these issues and agrees to the plan.

## 2025-03-31 NOTE — PATIENT INSTRUCTIONS
Take antibiotics with food and plenty of water.  Eat yogurt daily or use probiotics.  Make sure to finish the entire antibiotic treatment.  We will send the urine specimen for culture and call you in 2-3 days with results  You may take otc pyridium for urinary discomfort if needed.   Increase fluid intake and bladder emptying.  Return in 3 days if not better. Call if fever, vomiting, worsening symptoms.

## 2025-04-09 ENCOUNTER — HOSPITAL ENCOUNTER (OUTPATIENT)
Dept: CT IMAGING | Facility: HOSPITAL | Age: 20
Discharge: HOME | End: 2025-04-09
Attending: UROLOGY
Payer: COMMERCIAL

## 2025-04-09 DIAGNOSIS — N39.0 RECURRENT UTI: ICD-10-CM

## 2025-04-09 LAB
CREAT BLD-MCNC: 1 MG/DL
EGFRCR SERPLBLD CKD-EPI 2021: 83 ML/MIN/1.73M2 (ref 60–?)

## 2025-04-09 PROCEDURE — 74178 CT ABD&PLV WO CNTR FLWD CNTR: CPT | Performed by: UROLOGY

## 2025-04-09 PROCEDURE — 82565 ASSAY OF CREATININE: CPT

## 2025-04-09 RX ORDER — IOPAMIDOL 755 MG/ML
100 INJECTION, SOLUTION INTRAVASCULAR
Status: COMPLETED | OUTPATIENT
Start: 2025-04-09 | End: 2025-04-09

## 2025-04-09 RX ADMIN — IOPAMIDOL 80 ML: 755 INJECTION, SOLUTION INTRAVASCULAR at 08:26:00

## 2025-04-15 ENCOUNTER — TELEPHONE (OUTPATIENT)
Dept: SURGERY | Facility: CLINIC | Age: 20
End: 2025-04-15

## 2025-04-15 NOTE — TELEPHONE ENCOUNTER
Pt's message 4-14-25. Pt called.  Had the ct scan.  Received the results on [employer].  Please call pt to discuss results.

## 2025-04-16 NOTE — TELEPHONE ENCOUNTER
Can you please let patient know that she has some kidney stones. I would like her to come into clinic to discuss in detail    Thanks  AR

## 2025-04-17 NOTE — TELEPHONE ENCOUNTER
[facility], was going to offer patient an appointment for tomorrow 4/18/2025 at 8am.

## 2025-04-18 ENCOUNTER — TELEPHONE (OUTPATIENT)
Dept: SURGERY | Facility: CLINIC | Age: 20
End: 2025-04-18

## 2025-04-18 ENCOUNTER — OFFICE VISIT (OUTPATIENT)
Dept: SURGERY | Facility: CLINIC | Age: 20
End: 2025-04-18
Payer: COMMERCIAL

## 2025-04-18 DIAGNOSIS — N20.0 NEPHROLITHIASIS: ICD-10-CM

## 2025-04-18 DIAGNOSIS — N39.0 RECURRENT UTI: Primary | ICD-10-CM

## 2025-04-18 PROBLEM — R07.9 CHEST PAIN, UNSPECIFIED: Status: ACTIVE | Noted: 2022-11-10

## 2025-04-18 PROCEDURE — 99214 OFFICE O/P EST MOD 30 MIN: CPT | Performed by: UROLOGY

## 2025-04-18 NOTE — PROGRESS NOTES
Kristina Bee MD  Department of Urology  [address]  [place] [address]    T: [phone]  F: [phone]    To: Jenaro Jaquez MD   [address] [address]    Re: Lore Avila   MRN: [id number]  : 2005    Dear Jenaro Jaquez MD,    Today I had the pleasure of seeing Lore Avila in my clinic. As you know, Ms. Avila is a pleasant 19 year old year old adult who I am seeing for followup CT scan. Patient was last seen in this department on 2025.    Briefly, patient was seen by me for recurrent urinary tract infections.  She was counseled on behavioral management.  I did offer her testing.  She opted for cystoscopy and CT scan.  Her cystoscopy was negative.  She did have a CT scan that demonstrated bilateral nonobstructing kidney stones.  The largest is on the right side that measures about 5.1 mm           PAST MEDICAL HISTORY:  Past Medical History[1]     PAST SURGICAL HISTORY:  Past Surgical History[2]      ALLERGIES:  Allergies[3]      MEDICATIONS:  Current Outpatient Medications   Medication Instructions    albuterol 108 (90 Base) MCG/ACT Inhalation Aero Soln Inhale 1 puff and hold breath for 10 seconds then release.  Wait 1 full minute and repeat for second puff.  Use every 4-6 hours as needed.    Norethin Ace-Eth Estrad-FE (JUNEL FE 1/20) 1-20 MG-MCG Oral Tab 1 tablet, Oral, Daily    ondansetron (ZOFRAN-ODT) 4 mg, Oral, Every 8 hours PRN        FAMILY HISTORY:  Family History[4]     SOCIAL HISTORY:  Short Social Hx on File[5]       PHYSICAL EXAMINATION:  There were no vitals filed for this visit.  CONSTITUTIONAL: No apparent distress, cooperative and communicative  NEUROLOGIC: Alert and oriented   HEAD: Normocephalic, atraumatic   EYES: Sclera non-icteric   ENT: Hearing intact, moist mucous membranes   NECK: No obvious goiter or masses   RESPIRATORY: Normal respiratory effort, Nonlabored breathing on room air  SKIN: No evident rashes   ABDOMEN: Soft,  nontender, nondistended, no rebound tenderness, no guarding, no masses      REVIEW OF SYSTEMS:    A comprehensive 10-point review of systems was completed.  Pertinent positives and negatives are noted in the the HPI.       LABORATORY DATA:      Component  Ref Range & Units (hover) 4/9/25  8:23 AM   ISTAT Creatinine 1.00   Comment: This test may exhibit falsely elevated results when a sample is collected from a patient who is presently taking Hydroxyurea. If patient is consuming Hydroxyurea, i-STAT creatinine analysis should be considered inaccurate and a sample should be referred to the Central Lab for analysis, if clinically indicated.   eGFR-Cr 83           URINE CULTURE 10,000 - 50,000 CFU/ML Klebsiella pneumoniae Abnormal         Resulting Agency: [facility] ([facility])     Susceptibility     Klebsiella pneumoniae     Not Specified    Ampicillin >=32 Resistant    Ampicillin + Sulbactam 4 Sensitive    Cefazolin <=4 Sensitive    Ciprofloxacin <=0.25 Sensitive    Gentamicin <=1 Sensitive    Levofloxacin <=0.12 Sensitive    Meropenem <=0.25 Sensitive    Nitrofurantoin 64 Intermediate    Piperacillin + Tazobactam <=4 Sensitive    Trimethoprim/Sulfa <=20 Sensitive              Linear View            Component  Ref Range & Units (hover) 3/31/25  2:03 PM   Glucose Urine Negative   Bilirubin Urine Negative   Ketones, UA Negative   Spec Gravity 1.020   Blood Urine Trace-intact Abnormal    PH Urine 7.5   Protein Urine Trace   Urobilinogen Urine 4.0 Abnormal    Nitrite Urine Positive Abnormal    Leukocyte Esterase Urine Trace Abnormal    APPEARANCE Cloudy   Color Urine Yellow   Multistix Lot# [id number]   Multistix Expiration Date 9/30/25                  IMAGING REVIEW:  Narrative   PROCEDURE: CT UROGRAM (W+WO)  (CPT=74178)     COMPARISON: US KIDNEY/BLADDER (ORF=84427), 10/28/2021, 12:19 PM.  [facility], CT ABDOMEN+PELVIS KIDNEYSTONE 2D RNDR(NO IV,NO ORAL)(CPT=74176), 10/23/2023, 12:02 PM.  [facility]  [facility], CT ABDOMEN + PELVIS  KIDNEYSTONE 2D RNDR (NO IV NO ORAL) (CPT=741, 10/11/2021, 6:56 PM.     INDICATIONS: Recurrent urinary tract infection.     TECHNIQUE: Multidetector CT images of the abdomen and pelvis were obtained without and with non-ionic intravenous contrast material. Automated exposure control for dose reduction was used. Adjustment of the mA and/or kV was done based on the patient's  size. Iterative reconstruction technique for dose reduction was employed. Dose information was transmitted to the ACR (American College of Radiology) NRDR (National Radiology Data Registry), which includes the Dose Index Registry. No oral contrast was  ingested.     FINDINGS:  LUNG BASES: The heart is normal in size. There is no visible pulmonary or pleural disease.  KIDNEYS:   A background of medullary hyperdensity is noted. Multiple bilateral renal calculi are demonstrated; a reference right renal calculus measures 0.3 x 0.6 x 0.4 cm (density of 824 Hounsfield units). No ureteral calculi are identified. There is no   hydronephrosis or hydroureter. No perinephric or periureteric fat stranding is appreciated. No suspicious enhancing renal lesions are evident. The ureters are normal in course and caliber without visualized filling defects on excretory phase imaging.  URINARY BLADDER: No visible calculus. There is circumferential bladder wall thickening out of proportion to the degree of distention. On delayed phase imaging, excreted contrast material is present in the bladder lumen.  LIVER: Isolated low density adjacent to the falciform ligament is characteristic for focal fatty change. No enlargement, atrophy, further abnormal density, or significant focal lesion is identified.    BILIARY: The gallbladder is present.  PANCREAS: No lesion, fluid collection, ductal dilatation, or atrophy.  SPLEEN: No enlargement.  ADRENALS:   No defined mass or abnormal enlargement.  GI/MESENTERY: There is no evidence  of bowel obstruction. A normal caliber appendix is seen without inflammatory manifestations. A heavy stool burden is demonstrated.  PELVIC NODES: No lymphadenopathy.  PELVIC ORGANS: No visible mass. The uterus is present. Prominent left-sided ovarian veins are noted with left periuterine collateral vessels.  VASCULATURE:   No aneurysm is detected.  RETROPERITONEUM: No mass or lymphadenopathy is apparent.  BONES:   Levoscoliosis of the lumbar spine is appreciated.  ABDOMINAL WALL: There is mild metallic streak artifact from a navel piercing.  OTHER: No free air or significant fluid is seen in the abdomen or pelvis.                    Impression   CONCLUSION:  1. Bilateral nonobstructing renal calculi are evident, perhaps arising in a background of medullary nephrocalcinosis. No evidence of ureterolithiasis or obstructive uropathy.     2. Diffuse bladder wall thickening, which may reflect underlying cystitis.     3. Negative for suspicious lesions of the genitourinary tract.     4. Lesser incidental findings as above.           elm-remote.        Dictated by (CST): Ramakrishna Agudelo MD on 4/11/2025 at 2:56 PM      Finalized by (CST): Ramakrishna Agudelo MD on 4/11/2025 at 3:04 PM         OTHER RELEVANT DATA:   none      IMPRESSION: recurrent urinary tract infections in patient who has nonobstructing stones bilaterally with the greatest stone measuring 5 mm on the right side.  Offered observation versus definitive ureteroscopy (surgery) with the understanding that surgery may not resolve her urinary tract infections.  Having stones may suggest that she is likely not drinking enough water which can also lead to UTIs.    We discussed ureteroscopy including how it is performed and associated risks. I reviewed risks including bleeding, infection, damage to surrounding structures (urethra, bladder, ureter, kidney), inability to treat the stone and need for stent alone, need for additional/multiple procedures, need for prolonged  stent, need for nephrostomy tube, stent colic/discomfort (extreme flank pain, bladder discomfort/spasms, urinary urgency and frequency, hematuria), ureteral stricture, ureteral avulsion requiring open surgery, sepsis, anesthesia complications (cardiorespiratory complications).     I also counseled patient that NSAIDs and blood thinning agents need to be stopped appropriately prior to surgery. Discussed return precautions including fevers, chills, nausea, vomiting, intractable pain. Stent colic including flank pain, urgency, frequency and blood in the urine is common. Patient agreed with the plan and understood the above.    Patient was counseled on stone prevention methods including hydration (until urine is clear), limiting salt and red meat/meat intake, eating a normal calcium diet, and drinking lemon with water. We also talked about reasons to go to the emergency room - namely acute flank pain associated with fevers, chills, nausea or vomiting.       PLAN:  Patient to let me know how they want to proceed  RBUS in 6 months if chooses observation  Fu in 6 months or sooner    Thank you for referring this very pleasant patient to my clinic. If you have any questions or concerns, please do not hesitate to contact me.    Sincerely,  Kristina Bee MD    30 minutes were spent on this patient at this visit obtaining a history, reviewing medical records, developing a treatment plan, counseling and discussing treatment strategy with patient, coordination of care and documentation.     The 21st Century Cures Act makes medical notes available to patients in the interest of transparency.  However, please be advised that this is a medical document.  It is intended as a peer to peer communication.  It is written in medical language and may contain abbreviations or verbiage that are technical and unfamiliar.  It may appear blunt or direct.  Medical documents are intended to carry relevant information, facts as evident, and  the clinical opinion of the practitioner.         [1]   Past Medical History:   Extrinsic asthma, unspecified    History of UTI    Nephrolithiasis    Scoliosis   [2]   Past Surgical History:  Procedure Laterality Date    Other surgical history  10/30/2021    Lt RPG, Lt URS, Lt stent insertion  - Dr. Hearn   [3]   Allergies  Allergen Reactions    Penicillin G Benzathine RASH   [4]   Family History  Problem Relation Age of Onset    No Known Problems Father     Cancer Paternal Grandmother     Alcohol abuse Paternal Grandfather     Suicide History Maternal Cousin 19        crashed car into a tree    Depression Cousin     Other (heart and lung transplant) Paternal Uncle     Diabetes Neg     Hypertension Neg     Heart Disorder Neg    [5]   Social History  Socioeconomic History    Marital status: Single   Tobacco Use    Smoking status: Never     Passive exposure: Yes    Smokeless tobacco: Never   Vaping Use    Vaping status: Former   Substance and Sexual Activity    Alcohol use: Yes     Comment: rarely at family partie - takes a sip.    Drug use: Not Currently     Types: Cannabis     Comment: a month and 1/2  ago smoked cannabis.  Used a couple puffs once a week.   Other Topics Concern    Caffeine Concern Yes     Comment: rarely soda    Second-hand smoke exposure No    Alcohol/drug concerns No    Violence concerns No

## 2025-04-19 ENCOUNTER — OFFICE VISIT (OUTPATIENT)
Dept: FAMILY MEDICINE CLINIC | Facility: CLINIC | Age: 20
End: 2025-04-19
Payer: COMMERCIAL

## 2025-04-19 VITALS
DIASTOLIC BLOOD PRESSURE: 58 MMHG | TEMPERATURE: 97 F | HEIGHT: 62 IN | BODY MASS INDEX: 20.24 KG/M2 | SYSTOLIC BLOOD PRESSURE: 93 MMHG | HEART RATE: 83 BPM | OXYGEN SATURATION: 98 % | WEIGHT: 110 LBS

## 2025-04-19 DIAGNOSIS — Z63.0 PROBLEMS IN RELATIONSHIP WITH SPOUSE OR PARTNER: ICD-10-CM

## 2025-04-19 DIAGNOSIS — Z59.41 FOOD INSECURITY: ICD-10-CM

## 2025-04-19 DIAGNOSIS — N94.89 PELVIC CONGESTION SYNDROME: Primary | ICD-10-CM

## 2025-04-19 PROCEDURE — 99213 OFFICE O/P EST LOW 20 MIN: CPT

## 2025-04-19 PROCEDURE — 3008F BODY MASS INDEX DOCD: CPT

## 2025-04-19 PROCEDURE — 3074F SYST BP LT 130 MM HG: CPT

## 2025-04-19 PROCEDURE — 3078F DIAST BP <80 MM HG: CPT

## 2025-04-19 SDOH — SOCIAL STABILITY - SOCIAL INSECURITY: PROBLEMS IN RELATIONSHIP WITH SPOUSE OR PARTNER: Z63.0

## 2025-04-19 SDOH — ECONOMIC STABILITY - FOOD INSECURITY: FOOD INSECURITY: Z59.41

## 2025-04-19 NOTE — PROGRESS NOTES
HPI:    Patient ID: Lore Avila is a 19 year old adult.    HPI     Patient here in office to discuss CT urogram results, completed on 4/9/25 by urology.  Is concerned about findings noted in pelvic organs which include prominent left sided ovarian veins with left periuterine collateral vessels.  Reports pelvic pain, rated 8-9/10 at worst.  Also complains of nausea, unsure if related to acid reflux.  Tried famotidine in the past with no improvement in symptoms.  Taking Zofran as needed.  Interested in referral to OB/GYN  to discuss abnormal pelvic findings.      Review of Systems   Constitutional: Negative.    Respiratory: Negative.     Cardiovascular: Negative.    Gastrointestinal:  Positive for nausea.   Genitourinary:  Positive for pelvic pain.   Skin: Negative.    Neurological: Negative.    Psychiatric/Behavioral: Negative.            Current Medications[1]  Allergies:Allergies[2]   BP 93/58   Pulse 83   Temp 97.4 °F (36.3 °C) (Temporal)   Ht 5' 2\" (1.575 m)   Wt 110 lb (49.9 kg)   LMP 04/18/2025 (Exact Date)   SpO2 98%   BMI 20.12 kg/m²   Body mass index is 20.12 kg/m².  PHYSICAL EXAM:   Physical Exam  Vitals reviewed.   Constitutional:       General: She is not in acute distress.     Appearance: Normal appearance. She is not ill-appearing.   Cardiovascular:      Rate and Rhythm: Normal rate and regular rhythm.      Heart sounds: Normal heart sounds. No murmur heard.     No friction rub. No gallop.   Pulmonary:      Effort: Pulmonary effort is normal. No respiratory distress.      Breath sounds: Normal breath sounds. No stridor. No wheezing, rhonchi or rales.   Chest:      Chest wall: No tenderness.   Abdominal:      Tenderness: There is abdominal tenderness.      Comments: Left pelvic tenderness    Skin:     General: Skin is warm.      Findings: No rash.   Neurological:      General: No focal deficit present.      Mental Status: She is alert and oriented to person, place, and time.   Psychiatric:          Mood and Affect: Mood normal.         Behavior: Behavior normal.         Thought Content: Thought content normal.         Judgment: Judgment normal.                ASSESSMENT/PLAN:   1. Pelvic congestion syndrome  - OBG Referral - In Network    2. Food insecurity      3. Problems in relationship with spouse or partner      No orders of the defined types were placed in this encounter.      Meds This Visit:  Requested Prescriptions      No prescriptions requested or ordered in this encounter       Imaging & Referrals:  OBG - INTERNAL         ID#[id number]         [1]   Current Outpatient Medications   Medication Sig Dispense Refill    ondansetron 4 MG Oral Tablet Dispersible Take 1 tablet (4 mg total) by mouth every 8 (eight) hours as needed for Nausea. 10 tablet 1    Norethin Ace-Eth Estrad-FE (JUNEL FE 1/20) 1-20 MG-MCG Oral Tab Take 1 tablet by mouth daily. 28 tablet 12    albuterol 108 (90 Base) MCG/ACT Inhalation Aero Soln Inhale 1 puff and hold breath for 10 seconds then release.  Wait 1 full minute and repeat for second puff.  Use every 4-6 hours as needed. 18 g 3   [2]   Allergies  Allergen Reactions    Penicillin G Benzathine RASH

## 2025-04-21 ENCOUNTER — TELEPHONE (OUTPATIENT)
Dept: SURGERY | Facility: CLINIC | Age: 20
End: 2025-04-21

## 2025-04-21 DIAGNOSIS — N39.0 RECURRENT UTI: Primary | ICD-10-CM

## 2025-04-21 DIAGNOSIS — N20.0 NEPHROLITHIASIS: ICD-10-CM

## 2025-04-21 NOTE — TELEPHONE ENCOUNTER
Hi!  This patient needs a ureteroscopy. Should be booked for 45 min in the next 1-6 weeks. Needs labs as ordered below.    Thanks!  AR    Urology Surgery Scheduling Request    Location: [facility] OR    Surgeon: BENI Bee MD    Asst. Surgeon: N/A    Diagnosis: Nephrolithiasis    Procedure: Cystoscopy right ureteroscopy, laser lithotripsy, retrograde pyelogram, stent placement     Anesthesia: General     Time Frame: 1-6 weeks    Time needed: 45 min    Special Equipment: Holmium Laser    On Call to OR: [email or web address]    Admission: Day Surgery    Pre-op Testing: CBC, CMP, PT/INR and Urine Culture     Need Pre-op Clearance: none    Estimated Post Op/Follow Up Appt: tbd.    Kristina Bee MD

## 2025-04-22 ENCOUNTER — LAB ENCOUNTER (OUTPATIENT)
Dept: LAB | Age: 20
End: 2025-04-22
Attending: UROLOGY
Payer: COMMERCIAL

## 2025-04-22 DIAGNOSIS — N20.0 NEPHROLITHIASIS: ICD-10-CM

## 2025-04-22 LAB
ANION GAP SERPL CALC-SCNC: 7 MMOL/L (ref 0–18)
BUN BLD-MCNC: 8 MG/DL (ref 9–23)
BUN/CREAT SERPL: 9.4 (ref 10–20)
CALCIUM BLD-MCNC: 9.5 MG/DL (ref 8.7–10.4)
CHLORIDE SERPL-SCNC: 105 MMOL/L (ref 98–112)
CO2 SERPL-SCNC: 28 MMOL/L (ref 21–32)
CREAT BLD-MCNC: 0.85 MG/DL
DEPRECATED RDW RBC AUTO: 45.4 FL (ref 35.1–46.3)
EGFRCR SERPLBLD CKD-EPI 2021: 101 ML/MIN/1.73M2 (ref 60–?)
ERYTHROCYTE [DISTWIDTH] IN BLOOD BY AUTOMATED COUNT: 13.4 % (ref 11–15)
FASTING STATUS PATIENT QL REPORTED: NO
GLUCOSE BLD-MCNC: 92 MG/DL (ref 70–99)
HCT VFR BLD AUTO: 43.5 %
HGB BLD-MCNC: 14.2 G/DL (ref 7–20)
INR BLD: 0.88 (ref 0.8–1.2)
MCH RBC QN AUTO: 30.4 PG (ref 26–34)
MCHC RBC AUTO-ENTMCNC: 32.6 G/DL (ref 31–37)
MCV RBC AUTO: 93.1 FL
OSMOLALITY SERPL CALC.SUM OF ELEC: 288 MOSM/KG (ref 275–295)
PLATELET # BLD AUTO: 295 10(3)UL (ref 150–450)
POTASSIUM SERPL-SCNC: 3.8 MMOL/L (ref 3.5–5.1)
PROTHROMBIN TIME: 12.5 SECONDS (ref 11.6–14.8)
RBC # BLD AUTO: 4.67 X10(6)UL
SODIUM SERPL-SCNC: 140 MMOL/L (ref 136–145)
WBC # BLD AUTO: 6.5 X10(3) UL (ref 4–11)

## 2025-04-22 PROCEDURE — 85027 COMPLETE CBC AUTOMATED: CPT

## 2025-04-22 PROCEDURE — 87086 URINE CULTURE/COLONY COUNT: CPT

## 2025-04-22 PROCEDURE — 85610 PROTHROMBIN TIME: CPT

## 2025-04-22 PROCEDURE — 80048 BASIC METABOLIC PNL TOTAL CA: CPT

## 2025-04-22 PROCEDURE — 36415 COLL VENOUS BLD VENIPUNCTURE: CPT

## 2025-04-29 NOTE — DISCHARGE INSTRUCTIONS
You should expect to have some blood in your urine, burning when you pee, urgency and frequency. This is normal.   If you have a fever >101F, chills, nausea, vomiting, inability to urinate please go to the emergency room for evaluation.   Pain is normal after this procedure. Try to take Tylenol ibuprofen and use hot packs. Drink as much water as possible. Additionally you can try over the counter pyridium if you notice bladder discomfort.    We have also prescribed you 3 days of an antibiotic that I request that you take.    You should remove your stent in 7 days. It is taped to the top part of your groin. You can take the tape off and pull the black string. You should see 2 curls of the stent when it is removed. If you do not feel comfortable removing it yourself you should contact us and we can help arrange follow up. Please BE CAREFUL not to inadvertently pull on the black string (stent) taped to your groin. This may cause the stent to dislodge slightly and lead to continuous urine leakage (incontinence). We will ask you to wear a pad or depend if this happens until your stent can come out (7 days).    You have a follow up in 6 to 8 weeks with a renal bladder ultrasound and follow-up with your physician or a nurse practitioner. This is to make sure that your kidney still looks healthy.    Stone prevention methods including hydration (until urine is clear), limiting salt and red meat/meat intake, eating a normal calcium diet, and drinking lemon with water. We also talked about reasons to go to the emergency room - namely acute flank pain associated with fevers, chills, nausea or vomiting.   Call T: [phone] if you have any questions or concerns    TO DO:  1. Schedule ultrasound 6-8 weeks after stent is removed  2. Schedule follow up with Dr. Bee 1 week after ultrasound       HOME INSTRUCTIONS  AMBS[facility]G HOME CARE INSTRUCTIONS: POST-OP ANESTHESIA  The medication that you received for sedation or general  anesthesia can last up to 24 hours. Your judgment and reflexes may be altered, even if you feel like your normal self.      We Recommend:   Do not drive any motor vehicle or bicycle   Avoid mowing the lawn, playing sports, or working with power tools/applicances (power saws, electric knives or mixers)   That you have someone stay with you on your first night home   Do not drink alcohol or take sleeping pills or tranquilizers   Do not sign legal documents within 24 hours of your procedure   If you had a nerve block for your surgery, take extra care not to put any pressure on your arm or hand for 24 hours    It is normal:  For you to have a sore throat if you had a breathing tube during surgery (while you were asleep!). The sore throat should get better within 48 hours. You can gargle with warm salt water (1/2 tsp in 4 oz warm water) or use a throat lozenge for comfort  To feel muscle aches or soreness especially in the abdomen, chest or neck. The achy feeling should go away in the next 24 hours  To feel weak, sleepy or \"wiped out\". Your should start feeling better in the next 24 hours.   To experience mild discomforts such as sore lip or tongue, headache, cramps, gas pains or a bloated feeling in your abdomen.   To experience mild back pain or soreness for a day or two if you had spinal or epidural anesthesia.   If you had laparoscopic surgery, to feel shoulder pain or discomfort on the day of surgery.   For some patients to have nausea after surgery/anesthesia    If you feel nausea or experience vomiting:   Try to move around less.   Eat less than usual or drink only liquids until the next morning   Nausea should resolve in about 24 hours    If you have a problem when you are at home:    Call your surgeons office   Discharge Instructions: After Your Surgery  You’ve just had surgery. During surgery, you were given medicine called anesthesia to keep you relaxed and free of pain. After surgery, you may have some pain  or nausea. This is common. Here are some tips for feeling better and getting well after surgery.   Going home  Your healthcare provider will show you how to take care of yourself when you go home. They'll also answer your questions. Have an adult family member or friend drive you home. For the first 24 hours after your surgery:   Don't drive or use heavy equipment.  Don't make important decisions or sign legal papers.  Take medicines as directed.  Don't drink alcohol.  Have someone stay with you, if needed. They can watch for problems and help keep you safe.  Be sure to go to all follow-up visits with your healthcare provider. And rest after your surgery for as long as your provider tells you to.   Coping with pain  If you have pain after surgery, pain medicine will help you feel better. Take it as directed, before pain becomes severe. Also, ask your healthcare provider or pharmacist about other ways to control pain. This might be with heat, ice, or relaxation. And follow any other instructions your surgeon or nurse gives you.      Stay on schedule with your medicine.     Tips for taking pain medicine  To get the best relief possible, remember these points:   Pain medicines can upset your stomach. Taking them with a little food may help.  Most pain relievers taken by mouth need at least 20 to 30 minutes to start to work.  Don't wait till your pain becomes severe before you take your medicine. Try to time your medicine so that you can take it before starting an activity. This might be before you get dressed, go for a walk, or sit down for dinner.  Constipation is a common side effect of some pain medicines. Call your healthcare provider before taking any medicines, such as laxatives or stool softeners, to help ease constipation. Also ask if you should skip any foods. Drinking lots of fluids and eating foods, such as fruits and vegetables, that are high in fiber can also help. Remember, don't take laxatives unless  your surgeon has prescribed them.  Drinking alcohol and taking pain medicine can cause dizziness and slow your breathing. It can even be deadly. Don't drink alcohol while taking pain medicine.  Pain medicine can make you react more slowly to things. Don't drive or run machinery while taking pain medicine.  Your healthcare provider may tell you to take acetaminophen to help ease your pain. Ask them how much you're supposed to take each day. Acetaminophen or other pain relievers may interact with your prescription medicines or other over-the-counter (OTC) medicines. Some prescription medicines have acetaminophen and other ingredients in them. Using both prescription and OTC acetaminophen for pain can cause you to accidentally overdose. Read the labels on your OTC medicines with care. This will help you to clearly know the list of ingredients, how much to take, and any warnings. It may also help you not take too much acetaminophen. If you have questions or don't understand the information, ask your pharmacist or healthcare provider to explain it to you before you take the OTC medicine.   Managing nausea  Some people have an upset stomach (nausea) after surgery. This is often because of anesthesia, pain, or pain medicine, less movement of food in the stomach, or the stress of surgery. These tips will help you handle nausea and eat healthy foods as you get better. If you were on a special food plan before surgery, ask your healthcare provider if you should follow it while you get better. Check with your provider on how your eating should progress. It may depend on the surgery you had. These general tips may help:   Don't push yourself to eat. Your body will tell you when to eat and how much.  Start off with clear liquids and soup. They're easier to digest.  Next try semi-solid foods as you feel ready. These include mashed potatoes, applesauce, and gelatin.  Slowly move to solid foods. Don’t eat fatty, rich, or spicy  foods at first.  Don't force yourself to have 3 large meals a day. Instead eat smaller amounts more often.  Take pain medicines with a small amount of solid food, such as crackers or toast. This helps prevent nausea.  When to call your healthcare provider  Call your healthcare provider right away if you have any of these:   You still have too much pain, or the pain gets worse, after taking the medicine. The medicine may not be strong enough. Or there may be a complication from the surgery.  You feel too sleepy, dizzy, or groggy. The medicine may be too strong.  Side effects, such as nausea or vomiting. Your healthcare provider may advise taking other medicines to treat these or may change your treatment plan..  Skin changes, such as rash, itching, or hives. This may mean you have an allergic reaction. Your provider may advise taking other medicines.  The incision looks different (for instance, part of it opens up).  Bleeding or fluid leaking from the incision site, and you weren't told to expect that.  Fever of 100.4°F (38°C) or higher, or as directed by your healthcare provider.  Call 911  Call 911 right away if you have:   Trouble breathing  Facial swelling    If you have obstructive sleep apnea   You were given anesthesia medicine during surgery to keep you comfortable and free of pain. After surgery, you may have more apnea spells because of this medicine and other medicines you were given. The spells may last longer than normal.    At home:  Keep using the continuous positive airway pressure (CPAP) device when you sleep. Unless your healthcare provider tells you not to, use it when you sleep, day or night. CPAP is a common device used to treat obstructive sleep apnea.  Talk with your provider before taking any pain medicine, muscle relaxants, or sedatives. Your provider will tell you about the possible dangers of taking these medicines.  Contact your provider if your sleeping changes a lot even when taking  medicines as directed.  Carlos last reviewed this educational content on 4/1/2024  This information is for informational purposes only. This is not intended to be a substitute for professional medical advice, diagnosis, or treatment. Always seek the advice and follow the directions from your physician or other qualified health care provider.  © 0826-7417 [employer]. All rights reserved. This information is not intended as a substitute for professional medical care. Always follow your healthcare professional's instructions.

## 2025-04-30 ENCOUNTER — ANESTHESIA EVENT (OUTPATIENT)
Dept: SURGERY | Facility: HOSPITAL | Age: 20
End: 2025-04-30
Payer: COMMERCIAL

## 2025-04-30 ENCOUNTER — HOSPITAL ENCOUNTER (OUTPATIENT)
Facility: HOSPITAL | Age: 20
Setting detail: HOSPITAL OUTPATIENT SURGERY
Discharge: HOME | End: 2025-04-30
Attending: UROLOGY | Admitting: UROLOGY
Payer: COMMERCIAL

## 2025-04-30 ENCOUNTER — APPOINTMENT (OUTPATIENT)
Dept: GENERAL RADIOLOGY | Facility: HOSPITAL | Age: 20
End: 2025-04-30
Attending: UROLOGY
Payer: COMMERCIAL

## 2025-04-30 ENCOUNTER — ANESTHESIA (OUTPATIENT)
Dept: SURGERY | Facility: HOSPITAL | Age: 20
End: 2025-04-30
Payer: COMMERCIAL

## 2025-04-30 VITALS
BODY MASS INDEX: 19.69 KG/M2 | HEIGHT: 62 IN | HEART RATE: 69 BPM | WEIGHT: 107 LBS | SYSTOLIC BLOOD PRESSURE: 109 MMHG | TEMPERATURE: 99 F | OXYGEN SATURATION: 98 % | RESPIRATION RATE: 16 BRPM | DIASTOLIC BLOOD PRESSURE: 65 MMHG

## 2025-04-30 DIAGNOSIS — N20.0 NEPHROLITHIASIS: ICD-10-CM

## 2025-04-30 DIAGNOSIS — N39.0 RECURRENT UTI: ICD-10-CM

## 2025-04-30 LAB — B-HCG UR QL: NEGATIVE

## 2025-04-30 PROCEDURE — 52356 CYSTO/URETERO W/LITHOTRIPSY: CPT | Performed by: UROLOGY

## 2025-04-30 PROCEDURE — 74420 UROGRAPHY RTRGR +-KUB: CPT | Performed by: UROLOGY

## 2025-04-30 DEVICE — URETERAL STENT
Type: IMPLANTABLE DEVICE | Site: URETER | Status: FUNCTIONAL
Brand: CONTOUR™

## 2025-04-30 RX ORDER — ONDANSETRON 2 MG/ML
INJECTION INTRAMUSCULAR; INTRAVENOUS AS NEEDED
Status: DISCONTINUED | OUTPATIENT
Start: 2025-04-30 | End: 2025-04-30 | Stop reason: SURG

## 2025-04-30 RX ORDER — NALOXONE HYDROCHLORIDE 0.4 MG/ML
80 INJECTION, SOLUTION INTRAMUSCULAR; INTRAVENOUS; SUBCUTANEOUS AS NEEDED
Status: DISCONTINUED | OUTPATIENT
Start: 2025-04-30 | End: 2025-04-30

## 2025-04-30 RX ORDER — HYDROMORPHONE HYDROCHLORIDE 1 MG/ML
0.6 INJECTION, SOLUTION INTRAMUSCULAR; INTRAVENOUS; SUBCUTANEOUS EVERY 5 MIN PRN
Status: DISCONTINUED | OUTPATIENT
Start: 2025-04-30 | End: 2025-04-30

## 2025-04-30 RX ORDER — HYDROMORPHONE HYDROCHLORIDE 1 MG/ML
0.4 INJECTION, SOLUTION INTRAMUSCULAR; INTRAVENOUS; SUBCUTANEOUS EVERY 5 MIN PRN
Status: DISCONTINUED | OUTPATIENT
Start: 2025-04-30 | End: 2025-04-30

## 2025-04-30 RX ORDER — MORPHINE SULFATE 10 MG/ML
6 INJECTION, SOLUTION INTRAMUSCULAR; INTRAVENOUS EVERY 10 MIN PRN
Status: DISCONTINUED | OUTPATIENT
Start: 2025-04-30 | End: 2025-04-30

## 2025-04-30 RX ORDER — TAMSULOSIN HYDROCHLORIDE 0.4 MG/1
0.4 CAPSULE ORAL DAILY
Qty: 30 CAPSULE | Refills: 0 | Status: SHIPPED | OUTPATIENT
Start: 2025-04-30 | End: 2025-05-30

## 2025-04-30 RX ORDER — HYDROMORPHONE HYDROCHLORIDE 1 MG/ML
0.2 INJECTION, SOLUTION INTRAMUSCULAR; INTRAVENOUS; SUBCUTANEOUS EVERY 5 MIN PRN
Status: DISCONTINUED | OUTPATIENT
Start: 2025-04-30 | End: 2025-04-30

## 2025-04-30 RX ORDER — ONDANSETRON 2 MG/ML
4 INJECTION INTRAMUSCULAR; INTRAVENOUS ONCE
Status: COMPLETED | OUTPATIENT
Start: 2025-04-30 | End: 2025-04-30

## 2025-04-30 RX ORDER — IOPAMIDOL 612 MG/ML
INJECTION, SOLUTION INTRATHECAL AS NEEDED
Status: DISCONTINUED | OUTPATIENT
Start: 2025-04-30 | End: 2025-04-30 | Stop reason: HOSPADM

## 2025-04-30 RX ORDER — SODIUM CHLORIDE, SODIUM LACTATE, POTASSIUM CHLORIDE, CALCIUM CHLORIDE 600; 310; 30; 20 MG/100ML; MG/100ML; MG/100ML; MG/100ML
INJECTION, SOLUTION INTRAVENOUS CONTINUOUS
Status: DISCONTINUED | OUTPATIENT
Start: 2025-04-30 | End: 2025-04-30

## 2025-04-30 RX ORDER — MORPHINE SULFATE 4 MG/ML
4 INJECTION, SOLUTION INTRAMUSCULAR; INTRAVENOUS EVERY 10 MIN PRN
Status: DISCONTINUED | OUTPATIENT
Start: 2025-04-30 | End: 2025-04-30

## 2025-04-30 RX ORDER — ACETAMINOPHEN 500 MG
1000 TABLET ORAL ONCE
Status: DISCONTINUED | OUTPATIENT
Start: 2025-04-30 | End: 2025-04-30 | Stop reason: HOSPADM

## 2025-04-30 RX ORDER — PHENAZOPYRIDINE HYDROCHLORIDE 100 MG/1
100 TABLET, FILM COATED ORAL ONCE AS NEEDED
Status: COMPLETED | OUTPATIENT
Start: 2025-04-30 | End: 2025-04-30

## 2025-04-30 RX ORDER — SULFAMETHOXAZOLE AND TRIMETHOPRIM 800; 160 MG/1; MG/1
1 TABLET ORAL 2 TIMES DAILY
Qty: 6 TABLET | Refills: 0 | Status: SHIPPED | OUTPATIENT
Start: 2025-04-30 | End: 2025-05-03

## 2025-04-30 RX ORDER — MIDAZOLAM HYDROCHLORIDE 1 MG/ML
INJECTION INTRAMUSCULAR; INTRAVENOUS AS NEEDED
Status: DISCONTINUED | OUTPATIENT
Start: 2025-04-30 | End: 2025-04-30 | Stop reason: SURG

## 2025-04-30 RX ORDER — MORPHINE SULFATE 4 MG/ML
2 INJECTION, SOLUTION INTRAMUSCULAR; INTRAVENOUS EVERY 10 MIN PRN
Status: DISCONTINUED | OUTPATIENT
Start: 2025-04-30 | End: 2025-04-30

## 2025-04-30 RX ORDER — DEXAMETHASONE SODIUM PHOSPHATE 4 MG/ML
VIAL (ML) INJECTION AS NEEDED
Status: DISCONTINUED | OUTPATIENT
Start: 2025-04-30 | End: 2025-04-30 | Stop reason: SURG

## 2025-04-30 RX ORDER — LIDOCAINE HYDROCHLORIDE 10 MG/ML
INJECTION, SOLUTION EPIDURAL; INFILTRATION; INTRACAUDAL; PERINEURAL AS NEEDED
Status: DISCONTINUED | OUTPATIENT
Start: 2025-04-30 | End: 2025-04-30 | Stop reason: SURG

## 2025-04-30 RX ORDER — KETOROLAC TROMETHAMINE 30 MG/ML
INJECTION, SOLUTION INTRAMUSCULAR; INTRAVENOUS AS NEEDED
Status: DISCONTINUED | OUTPATIENT
Start: 2025-04-30 | End: 2025-04-30 | Stop reason: SURG

## 2025-04-30 RX ADMIN — LIDOCAINE HYDROCHLORIDE 50 MG: 10 INJECTION, SOLUTION EPIDURAL; INFILTRATION; INTRACAUDAL; PERINEURAL at 13:04:00

## 2025-04-30 RX ADMIN — DEXAMETHASONE SODIUM PHOSPHATE 4 MG: 4 MG/ML VIAL (ML) INJECTION at 13:04:00

## 2025-04-30 RX ADMIN — KETOROLAC TROMETHAMINE 30 MG: 30 INJECTION, SOLUTION INTRAMUSCULAR; INTRAVENOUS at 13:26:00

## 2025-04-30 RX ADMIN — MIDAZOLAM HYDROCHLORIDE 2 MG: 1 INJECTION INTRAMUSCULAR; INTRAVENOUS at 13:01:00

## 2025-04-30 RX ADMIN — ONDANSETRON 4 MG: 2 INJECTION INTRAMUSCULAR; INTRAVENOUS at 13:04:00

## 2025-04-30 NOTE — H&P
PRE-OP NOTE     NAME/MRN/PROCEDURE: Lore Avila - cystoscopy, right ureteroscopy, laser lithotripsy, stent placement   HPI: 19F with history of small nonobstructing kidney stones. Will plan for ureteroscopy as she wanted them treated given her Pamela. She understands that it may not clear her infections   PMH: UTI, asthma, nephrolithiasis  PSH: URS  MEDS: albuterol, norethin, ondansetron   ALL: pNC  LABS: Ucx, INR 0.88, Cr 0.85, hematocrit 43.5  IMAGING:     OTHER:   NAD, cooperative, communicative  Nonlabored breathing on room air  Soft, ND, NT, no guarding, no RT  wwp  ABX: ancef

## 2025-04-30 NOTE — ANESTHESIA PREPROCEDURE EVALUATION
Anesthesia PreOp Note    HPI:     Lore Avila is a 19 year old adult who presents for preoperative consultation requested by: Kristina Bee MD    Date of Surgery: 4/30/2025    Procedure(s):  Cystoscopy, right ureteroscopy, laser lithotripsy, right retrograde pyelogram, right stent placement  Cystoscopy with holmium laser  Cystoscopy retrograde  Cystoscopy stent insertion  Indication: Recurrent UTI [N39.0]  Nephrolithiasis [N20.0]    Relevant Problems   No relevant active problems       NPO:  Last Liquid Consumption Date: 04/29/25  Last Liquid Consumption Time: 2000  Last Solid Consumption Date: 04/29/25  Last Solid Consumption Time: 2000  Last Liquid Consumption Date: 04/29/25          History Review:  Patient Active Problem List    Diagnosis Date Noted    UTI due to Klebsiella species 01/17/2024    Eating disorder 01/17/2024    Breakthrough bleeding on Depo-Provera 11/14/2023    UTI symptoms 04/24/2023    Dyspareunia in female 04/24/2023    Birth control counseling 04/24/2023    Severe recurrent major depression without psychotic features (HCC) 12/11/2022    SID (generalized anxiety disorder) 12/08/2022    Dyspnea 12/08/2022    History of nephrolithiasis 12/08/2022    Chest pain, unspecified 11/10/2022    Lightheadedness 10/07/2022    Dizziness 10/07/2022    Syncope 10/07/2022    Poor nutrition 10/07/2022    Menstrual irregularity 10/07/2022    Other constipation 10/20/2021    Nephrolithiasis 10/20/2021    Hydroureteronephrosis 10/20/2021    Cough 12/03/2013       Past Medical History[1]    Past Surgical History[2]    Prescriptions Prior to Admission[3]  Current Medications and Prescriptions Ordered in Epic[4]    Allergies[5]    Family History[6]  Social Hx on file[7]    Available pre-op labs reviewed.  Lab Results   Component Value Date    WBC 6.5 04/22/2025    RBC 4.67 04/22/2025    HGB 14.2 04/22/2025    HCT 43.5 04/22/2025    MCV 93.1 04/22/2025    MCH 30.4 04/22/2025    MCHC 32.6 04/22/2025    RDW  13.4 04/22/2025    .0 04/22/2025    URINEPREG Negative 04/30/2025     Lab Results   Component Value Date     04/22/2025    K 3.8 04/22/2025     04/22/2025    CO2 28.0 04/22/2025    BUN 8 (L) 04/22/2025    CREATSERUM 0.85 04/22/2025    GLU 92 04/22/2025    CA 9.5 04/22/2025     Lab Results   Component Value Date    INR 0.88 04/22/2025       Vital Signs:  Body mass index is 19.57 kg/m².   height is 1.575 m (5' 2\") and weight is 48.5 kg (107 lb). Her oral temperature is 98.1 °F (36.7 °C). Her blood pressure is 102/58 and her pulse is 74. Her respiration is 18 and oxygen saturation is 100%.   Vitals:    04/28/25 1037 04/30/25 1211   BP:  102/58   Pulse:  74   Resp:  18   Temp:  98.1 °F (36.7 °C)   TempSrc:  Oral   SpO2:  100%   Weight: 49.9 kg (110 lb) 48.5 kg (107 lb)   Height: 1.575 m (5' 2\") 1.575 m (5' 2\")        Anesthesia Evaluation     Patient summary reviewed and Nursing notes reviewed    No history of anesthetic complications   Airway   Mallampati: II  Neck ROM: full  Dental      Pulmonary - negative ROS and normal exam   Cardiovascular - negative ROS and normal exam    Neuro/Psych - negative ROS     GI/Hepatic/Renal - negative ROS     Endo/Other - negative ROS   Abdominal  - normal exam                 Anesthesia Plan:   ASA:  2  Plan:   General  Airway:  ETT  Post-op Pain Management: IV analgesics  Informed Consent Plan and Risks Discussed With:  Patient  Discussed plan with:  CRNA      I have informed Lore Avila and/or legal guardian or family member of the nature of the anesthetic plan, benefits, risks including possible dental damage if relevant, major complications, and any alternative forms of anesthetic management.   All of the patient's questions were answered to the best of my ability. The patient desires the anesthetic management as planned.  Brent Yang MD  4/30/2025 12:36 PM  Present on Admission:  **None**           [1]   Past Medical History:   Anxiety state     Calculus of kidney    Depression    Esophageal reflux    Extrinsic asthma, unspecified    History of UTI    Migraines    Nephrolithiasis    Scoliosis    Shortness of breath   [2]   Past Surgical History:  Procedure Laterality Date    Other surgical history  10/30/2021    Lt RPG, Lt URS, Lt stent insertion  - Dr. Hearn   [3]   Medications Prior to Admission   Medication Sig Dispense Refill Last Dose/Taking    ondansetron 4 MG Oral Tablet Dispersible Take 1 tablet (4 mg total) by mouth every 8 (eight) hours as needed for Nausea. 10 tablet 1 Past Month    Norethin Ace-Eth Estrad-FE (JUNEL FE 1/20) 1-20 MG-MCG Oral Tab Take 1 tablet by mouth daily. 28 tablet 12 4/29/2025 at  9:05 PM    albuterol 108 (90 Base) MCG/ACT Inhalation Aero Soln Inhale 1 puff and hold breath for 10 seconds then release.  Wait 1 full minute and repeat for second puff.  Use every 4-6 hours as needed. 18 g 3 Past Week   [4]   Current Facility-Administered Medications Ordered in Epic   Medication Dose Route Frequency Provider Last Rate Last Admin    lactated ringers infusion   Intravenous Continuous Kristina Bee MD 20 mL/hr at 04/30/25 1229 New Bag at 04/30/25 1229    acetaminophen (Tylenol Extra Strength) tab 1,000 mg  1,000 mg Oral Once Kristina Bee MD        ceFAZolin (Ancef) 2g in 10mL IV syringe premix  2 g Intravenous Once Kristina Bee MD         No current [facility]-ordered outpatient medications on file.   [5]   Allergies  Allergen Reactions    Penicillin G Benzathine RASH     Denies skin peeling, blistering or organ damage   [6]   Family History  Problem Relation Age of Onset    No Known Problems Father     Cancer Paternal Grandmother     Alcohol abuse Paternal Grandfather     Suicide History Maternal Cousin 19        crashed car into a tree    Depression Cousin     Other (heart and lung transplant) Paternal Uncle     Diabetes Neg     Hypertension Neg     Heart Disorder Neg    [7]   Social History  Socioeconomic History     Marital status: Single   Tobacco Use    Smoking status: Never     Passive exposure: Yes    Smokeless tobacco: Never   Vaping Use    Vaping status: Former   Substance and Sexual Activity    Alcohol use: Yes     Comment: rarely at family partie - takes a sip.    Drug use: Not Currently     Types: Cannabis     Comment: a month and 1/2  ago smoked cannabis.  Used a couple puffs once a week.   Other Topics Concern    Caffeine Concern Yes     Comment: rarely soda    Second-hand smoke exposure No    Alcohol/drug concerns No    Violence concerns No

## 2025-04-30 NOTE — OPERATIVE REPORT
OPERATIVE REPORT  DATE OF SURGERY: 4/30/2025  PREOPERATIVE DIAGNOSIS: right kidney stone  POSTOPERATIVE DIAGNOSIS: same  PROCEDURE: Cystoscopy, right Ureteroscopy, laser lithotripsy, right retrograde pyelogram, placement of 6 x 24cm double j stent - on string  SURGEON: Kristina Bee MD  ASSISTANT: none  ANESTHESIA: general  FLUIDS: per anesthesia  URINE OUTPUT: n/a  ESTIMATED BLOOD LOSS: 3cc  SPECIMENS: right kidney stone  CONDITION: Stable to PACU    INDICATIONS: right kidney stone  PROCEDURE: The patient was met in the preoperative holding area. Appropriate informed consent was obtained and the patient was brought to the operative suite. ?Appropriate timeout was performed per hospital protocol. After the successful induction of general anesthesia, the patient was placed in the dorsal lithotomy position. ?Pressure points were meticulously padded. The patient was prepped and draped in a standard sterile fashion and IV ancef was given as preoperative prophylaxis. At this point, we passed a 22-Nepalese cystoscope per urethra into the bladder. ?We identified the right ureteric orifice and used fluoroscopic guidance to cannulate this with a .038 Sensor guidewire to the level of the kidney. ?A dual lumen catheter was then passed into the proximal ureter and a second wire was passed through the dual lumen catheter and the dual lumen catheter was removed. We kept one wire as a safety wire and an over our working wire, we passed a 12/14-Nepalese ureteral access sheath keeping one wire behind as a safety wire. We then passed a ureteroscope through the access sheath up into the kidney. The stone was seen in the middle of the kidney and was small/large in size. We brought in the 200 nanometer Track tip laser fiber on variable settings we ablated into smaller pieces. The tipless nitinol basket was used to completely extract all of the significant fragments and then we confirmed this with ureteroscopy again. ?We brought the  laser back in and dusted a few more tiny fragments with the laser, but then there was nothing significant remaining. ?At this point, the laser fiber was removed and the basket was removed. A retrograde was performed which demonstrated delicate calyces.?The access sheath was removed while scoping the entire ureter without evidence of any trauma or stone. ?Using the safety wire and fluoroscopic guidance, we passed a 6-Swiss x 24 cm stent with a nice coil in the right kidney and a nice coil in the bladder, the string was left. We secured the string to the  mons with Mastisol, steri strips and Tegaderm. ?The procedure was then complete. Patient was awoken from anesthesia and taken to the recovery room in stable condition. All counts were correct x2.    IMPRESSION: s/p right urs all stones treated - stent on string     PLAN:  Stent x 7 days  Rbus 6-8 weeks  Abx x3d

## 2025-04-30 NOTE — ANESTHESIA PROCEDURE NOTES
Airway  Date/Time: 4/30/2025 1:04 PM  Reason: Elective      General Information and Staff   Patient location during procedure: OR  Anesthesiologist: Brent Yang MD  Performed: anesthesiologist   Performed by: Brent Yang MD  Authorized by: Brent Yang MD        Indications and Patient Condition  Indications for airway management: anesthesia  Sedation level: deep      Preoxygenated: yesPatient position: sniffing    Mask difficulty assessment: 1 - vent by mask    Final Airway Details    Final airway type: supraglottic airway      Successful airway: classic  Size: 4     Number of attempts at approach: 1

## 2025-04-30 NOTE — ANESTHESIA POSTPROCEDURE EVALUATION
Patient: oLre Avila    Procedure Summary       Date: 04/30/25 Room / Location: [facility] MAIN OR 14 / [facility] MAIN OR; [facility] X-ray    Anesthesia Start: 1301 Anesthesia Stop:     Procedures:       XR OR - N/C      Cystoscopy, right ureteroscopy, laser lithotripsy, right retrograde pyelogram, right stent placement (Right: Ureter)      Cystoscopy with holmium laser (Right: Ureter)      Cystoscopy retrograde (Right: Ureter)      Cystoscopy stent insertion (Right: Ureter) Diagnosis:       Recurrent UTI      Nephrolithiasis      (Cystoscopy, right ureteroscopy, laser lithotripsy, right retrograde pyelogram, right stent placement)      (Recurrent UTI [N39.0]Nephrolithiasis [N20.0])    Scheduled Providers: Kristina Bee MD Anesthesiologist: Brent Yang MD    Anesthesia Type: general ASA Status: 2            Anesthesia Type: general    Vitals Value Taken Time   BP 99/66 04/30/25 13:39   Temp 98.1 °F (36.7 °C) 04/30/25 13:39   Pulse 105 04/30/25 13:39   Resp 14 04/30/25 13:39   SpO2 99 % 04/30/25 13:39   Vitals shown include unfiled device data.    [facility] AN Post Evaluation:   Patient Evaluated in PACU  Patient Participation: waiting for patient participation  Level of Consciousness: sleepy but conscious  Pain Score: 0  Pain Management: adequate  Airway Patency:patent  Dental exam unchanged from preop  Yes    Cardiovascular Status: stable and acceptable  Respiratory Status: acceptable and room air  Postoperative Hydration acceptable      ÓSCAR STONE CRNA  4/30/2025 1:40 PM

## 2025-05-02 ENCOUNTER — TELEPHONE (OUTPATIENT)
Dept: SURGERY | Facility: CLINIC | Age: 20
End: 2025-05-02

## 2025-05-02 NOTE — TELEPHONE ENCOUNTER
Patient calling stating she had surgery done with Dr Bee on 4/30  and has a stent per patient she has not received a call to schedule stent removal.Please advise

## 2025-05-02 NOTE — TELEPHONE ENCOUNTER
Spoke with patient, she states she does not feel comfortable removing stent by herself. I assisted in scheduling nurse visit. She confirmed and verbalized understanding.       Future Appointments   Date Time Provider Department Center   5/7/2025 10:20 AM [facility]OLOGY NURSE [facility]   5/13/2025  9:00 AM [facility]

## 2025-05-03 ENCOUNTER — TELEPHONE (OUTPATIENT)
Dept: SURGERY | Facility: CLINIC | Age: 20
End: 2025-05-03

## 2025-05-03 NOTE — TELEPHONE ENCOUNTER
Received call from patient; feeling constipated and having some stent related discomfort. No s/s of systemic illness.   Last BM ~5 days ago. Gave recommendations for OTC constipation management; she will do this. Increase water intake. She will update us with any changes.

## 2025-05-05 ENCOUNTER — TELEPHONE (OUTPATIENT)
Dept: SURGERY | Facility: CLINIC | Age: 20
End: 2025-05-05

## 2025-05-06 ENCOUNTER — NURSE ONLY (OUTPATIENT)
Dept: SURGERY | Facility: CLINIC | Age: 20
End: 2025-05-06

## 2025-05-06 DIAGNOSIS — N20.0 NEPHROLITHIASIS: Primary | ICD-10-CM

## 2025-05-06 PROCEDURE — 51700 IRRIGATION OF BLADDER: CPT | Performed by: UROLOGY

## 2025-05-06 NOTE — PROGRESS NOTES
I called pt into the exam room and verified the pt's name and  and I explained that I will be removing her dangle stent and she agreed to proceed. I asked pt if she wanted to sit or lay down on the exam table and she chose to lay down. I asked her to lower her bottom clothing to her ankles and I placed her in supine position. I then removed the tegaderm film from her genitalia and I then gently removed the stent and she tolerated it well. I gave her some wipes to clean up and she dressed and I sent her on her way.

## 2025-05-09 LAB
CAOX DIHYDRATE: 20 %
CAOX MONOHYDRATE: 80 %
WEIGHT-STONE: 23 MG

## 2025-05-13 ENCOUNTER — OFFICE VISIT (OUTPATIENT)
Dept: OBGYN CLINIC | Facility: CLINIC | Age: 20
End: 2025-05-13

## 2025-05-13 VITALS
HEART RATE: 99 BPM | DIASTOLIC BLOOD PRESSURE: 66 MMHG | SYSTOLIC BLOOD PRESSURE: 107 MMHG | WEIGHT: 108 LBS | BODY MASS INDEX: 20 KG/M2

## 2025-05-13 DIAGNOSIS — Z30.41 ORAL CONTRACEPTIVE PILL SURVEILLANCE: ICD-10-CM

## 2025-05-13 DIAGNOSIS — G43.109 MIGRAINE WITH AURA AND WITHOUT STATUS MIGRAINOSUS, NOT INTRACTABLE: ICD-10-CM

## 2025-05-13 DIAGNOSIS — F32.A ANXIETY AND DEPRESSION: ICD-10-CM

## 2025-05-13 DIAGNOSIS — N89.8 VAGINAL DISCHARGE: ICD-10-CM

## 2025-05-13 DIAGNOSIS — R10.2 PELVIC PAIN: Primary | ICD-10-CM

## 2025-05-13 DIAGNOSIS — F41.9 ANXIETY AND DEPRESSION: ICD-10-CM

## 2025-05-13 PROCEDURE — 3074F SYST BP LT 130 MM HG: CPT | Performed by: NURSE PRACTITIONER

## 2025-05-13 PROCEDURE — 99214 OFFICE O/P EST MOD 30 MIN: CPT | Performed by: NURSE PRACTITIONER

## 2025-05-13 PROCEDURE — 3078F DIAST BP <80 MM HG: CPT | Performed by: NURSE PRACTITIONER

## 2025-05-13 NOTE — PROGRESS NOTES
The following individual(s) verbally consented to be recorded using ambient AI listening technology and understand that they can each withdraw their consent to this listening technology at any point by asking the clinician to turn off or pause the recording:    Patient name: Lore Lisajean carlos Avila  Additional names:

## 2025-05-13 NOTE — PROGRESS NOTES
[facility]   Obstetrics and Gynecology    Lore Avila is a 19 year old female  Patient's last menstrual period was 2025 (exact date).   Chief Complaint   Patient presents with    Consult   .   History of Present Illness  Lore Avila is a 19 year old who presents with left-sided pelvic pain and concerns about enlarged pelvic veins found on CT urogram    She experiences left-sided pelvic pain for the past five to six months, described as cramping and occurring every other day, lasting for hours. The pain is unrelated to her menstrual cycle. A CT scan a month and a half ago showed enlarged veins in the left pelvis, raising concerns about their relation to her symptoms.    She is on a combination birth control pill for over a year, resulting in lighter and less painful periods. She occasionally forgets to take the pill on time but usually takes it within two hours. She is sexually active, has had more than one partner in the last year, male. She reports occasional abnormal vaginal discharge and sometimes experiences dyspareunia, more pronounced on the left side. No dysuria is reported.    Significant hx of UTIs and kidney stones. Recently had stent placed on right side last month    Has hx of migraine with aura - last one in high school but also gets migraines.  Pap:never  Contraception: combined ocps    OBSTETRICS HISTORY:  OB History    Para Term  AB Living   0 0 0 0 0 0   SAB IAB Ectopic Multiple Live Births   0 0 0 0 0       GYNE HISTORY:  Menarche: 12 (2025  8:54 AM)  Period Cycle (Days): MONHTLY (2025  8:54 AM)  Period Duration (Days): 3 TO 4 DAYS (2025  8:54 AM)  Period Flow: LIGHT (2025  8:54 AM)  Use of Birth Control (if yes, specify type): OCP (2025  8:54 AM)      History   Sexual Activity    Sexual activity: Yes    Partners: Male       MEDICAL HISTORY:  Past Medical History:    Anxiety state    Calculus of kidney    Depression    Esophageal  reflux    Extrinsic asthma, unspecified    History of UTI    Migraines    Nephrolithiasis    Scoliosis    Shortness of breath       SOCIAL HISTORY:  Social History     Socioeconomic History    Marital status: Single     Spouse name: Not on file    Number of children: Not on file    Years of education: Not on file    Highest education level: Not on file   Occupational History    Not on file   Tobacco Use    Smoking status: Never     Passive exposure: Yes    Smokeless tobacco: Never   Vaping Use    Vaping status: Former   Substance and Sexual Activity    Alcohol use: Yes     Comment: rarely at family partie - takes a sip.    Drug use: Not Currently     Comment: not currently    Sexual activity: Yes     Partners: Male   Other Topics Concern     Service Not Asked    Blood Transfusions Not Asked    Caffeine Concern Yes     Comment: rarely soda    Occupational Exposure Not Asked    Hobby Hazards Not Asked    Sleep Concern Not Asked    Stress Concern Not Asked    Weight Concern Not Asked    Special Diet Not Asked    Back Care Not Asked    Exercise Not Asked    Bike Helmet Not Asked    Seat Belt Not Asked    Self-Exams Not Asked    Second-hand smoke exposure No    Alcohol/drug concerns No    Violence concerns No   Social History Narrative    Not on file     Social Drivers of Health     Food Insecurity: Food Insecurity Present (4/19/2025)    NCSS - Food Insecurity     Worried About Running Out of Food in the Last Year: No     Ran Out of Food in the Last Year: Yes   Transportation Needs: No Transportation Needs (4/19/2025)    NCSS - Transportation     Lack of Transportation: No   Stress: Not on file   Housing Stability: Not At Risk (4/19/2025)    NCSS - Housing/Utilities     Has Housing: Yes     Worried About Losing Housing: No     Unable to Get Utilities: No       MEDICATIONS:    Current Outpatient Medications:     ondansetron 4 MG Oral Tablet Dispersible, Take 1 tablet (4 mg total) by mouth every 8 (eight) hours as  needed for Nausea., Disp: 10 tablet, Rfl: 1    Norethin Ace-Eth Estrad-FE (JUNEL FE 1/20) 1-20 MG-MCG Oral Tab, Take 1 tablet by mouth daily., Disp: 28 tablet, Rfl: 12    albuterol 108 (90 Base) MCG/ACT Inhalation Aero Soln, Inhale 1 puff and hold breath for 10 seconds then release.  Wait 1 full minute and repeat for second puff.  Use every 4-6 hours as needed., Disp: 18 g, Rfl: 3    ALLERGIES:    Allergies   Allergen Reactions    Penicillin G Benzathine RASH     Denies skin peeling, blistering or organ damage         Review of Systems:  Constitutional:  Denies fatigue, night sweats, hot flashes  Cardiovascular:  denies chest pain or palpitations  Respiratory:  denies shortness of breath  Gastrointestinal:  denies heartburn, abdominal pain, diarrhea or constipation  Genitourinary:  denies dysuria, incontinence, abnormal vaginal discharge, vaginal itching see HPI  Musculoskeletal:  denies back pain.  Skin/Breast:  Denies any breast pain, lumps, or discharge.   Neurological:  denies headaches, extremity weakness or numbness.  Psychiatric: denies depression or anxiety.  Endocrine:   denies excessive thirst or urination.  Heme/Lymph:  denies history of anemia, easy bruising or bleeding.      PHYSICAL EXAM:     Vitals:    05/13/25 0854   BP: 107/66   Pulse: 99   Weight: 108 lb (49 kg)     Body mass index is 19.75 kg/m².     Patient offered chaperone, patient declined    Constitutional: well developed, well nourished    Psychiatric:  Oriented to time, place, person and situation. Appropriate mood and affect    Pelvic Exam:  External Genitalia: normal appearance, hair distribution, and no lesions  Urethral Meatus:  normal in size, location, without lesions and prolapse  Bladder:  No fullness, masses or tenderness  Vagina:  Normal appearance without lesions, no abnormal discharge  Cervix:  no CMT, Normal without tenderness on motion  Uterus: normal in size, contour, position, mobility, without tenderness  Adnexa: normal  without masses or tenderness on right side; left adnexal tenderness, no masses  Perineum: normal  Anus: no hemorroids   Lymph node: no inguinal lymph nodes      Results  CT UROGRAM (W+WO) (CPT=74178) [[id number]] (Accession [id number]) (Order [id number])  PACS Images     Show images for CT UROGRAM (W+WO) (CPT=74178) Printable Result Report    Open Hard Copy Result Report (Order #[id number] - CT UROGRAM (W+WO) (CPT=74178))      Study Result    Narrative   PROCEDURE: CT UROGRAM (W+WO) SH (CPT=74178)     COMPARISON: US KIDNEY/BLADDER (NPI=[id number]), 10/28/2021, 12:19 PM.  [facility], CT ABDOMEN+PELVIS KIDNEYSTONE 2D RNDR(NO IV,NO ORAL)(CPT=74176), 10/23/2023, 12:02 PM.  [facility], CT ABDOMEN + PELVIS  KIDNEYSTONE 2D RNDR (NO IV NO ORAL) (CPT=741, 10/11/2021, 6:56 PM.     INDICATIONS: Recurrent urinary tract infection.     TECHNIQUE: Multidetector CT images of the abdomen and pelvis were obtained without and with non-ionic intravenous contrast material. Automated exposure control for dose reduction was used. Adjustment of the mA and/or kV was done based on the patient's  size. Iterative reconstruction technique for dose reduction was employed. Dose information was transmitted to the ACR (American College of Radiology) NRDR (National Radiology Data Registry), which includes the Dose Index Registry. No oral contrast was  ingested.     FINDINGS:  LUNG BASES: The heart is normal in size. There is no visible pulmonary or pleural disease.  KIDNEYS:   A background of medullary hyperdensity is noted. Multiple bilateral renal calculi are demonstrated; a reference right renal calculus measures 0.3 x 0.6 x 0.4 cm (density of 824 Hounsfield units). No ureteral calculi are identified. There is no   hydronephrosis or hydroureter. No perinephric or periureteric fat stranding is appreciated. No suspicious enhancing renal lesions are evident. The ureters are normal in course and caliber without  visualized filling defects on excretory phase imaging.  URINARY BLADDER: No visible calculus. There is circumferential bladder wall thickening out of proportion to the degree of distention. On delayed phase imaging, excreted contrast material is present in the bladder lumen.  LIVER: Isolated low density adjacent to the falciform ligament is characteristic for focal fatty change. No enlargement, atrophy, further abnormal density, or significant focal lesion is identified.    BILIARY: The gallbladder is present.  PANCREAS: No lesion, fluid collection, ductal dilatation, or atrophy.  SPLEEN: No enlargement.  ADRENALS:   No defined mass or abnormal enlargement.  GI/MESENTERY: There is no evidence of bowel obstruction. A normal caliber appendix is seen without inflammatory manifestations. A heavy stool burden is demonstrated.  PELVIC NODES: No lymphadenopathy.  PELVIC ORGANS: No visible mass. The uterus is present. Prominent left-sided ovarian veins are noted with left periuterine collateral vessels.  VASCULATURE:   No aneurysm is detected.  RETROPERITONEUM: No mass or lymphadenopathy is apparent.  BONES:   Levoscoliosis of the lumbar spine is appreciated.  ABDOMINAL WALL: There is mild metallic streak artifact from a navel piercing.  OTHER: No free air or significant fluid is seen in the abdomen or pelvis.       Impression   CONCLUSION:  1. Bilateral nonobstructing renal calculi are evident, perhaps arising in a background of medullary nephrocalcinosis. No evidence of ureterolithiasis or obstructive uropathy.     2. Diffuse bladder wall thickening, which may reflect underlying cystitis.     3. Negative for suspicious lesions of the genitourinary tract.     4. Lesser incidental findings as above.          Assessment & Plan:    ICD-10-CM    1. Pelvic pain  R10.2 US PELVIS W EV (CPT=76856/02234)     Chlamydia/GC PCR Combo     Vaginitis Vaginosis PCR Panel      2. Anxiety and depression  F41.9 Psychiatry Referral - In  Network    F32.A       3. Vaginal discharge  N89.8 Chlamydia/GC PCR Combo     Vaginitis Vaginosis PCR Panel      4. Oral contraceptive pill surveillance  Z30.41       5. Migraine with aura and without status migrainosus, not intractable  G43.109           Assessment & Plan  Chronic pelvic pain  Chronic pelvic pain for 5-6 months  CT scan showed enlarged left ovarian vein. Pain worsens with standing.  - Perform pelvic exam and obtain cultures to rule out infection.  - Order pelvic ultrasound to evaluate ovaries and uterus.  -will follow up after results.    Migraine with aura  Migraines with aura contraindicated with current combined hormonal contraceptive pill due to increased stroke risk. Discussed risks and recommended alternative progestin only pill.  Reviewed [facility]  - Strongly Recommend switching to a progestin-only pill due to contraindication of estrogen-containing birth control in patients with migraine with aura.  -patient will consider,    Kidney stones  Kidney stones with recent right-sided stent placement in April 2025 and previous left-sided stent in 2021. No current symptoms.    Anxiety and depression  Anxiety and depression managed with weekly therapy. Poor tolerance to antidepressants. Seeking psychiatric referral for medication management.  - Provide referral to [facility] for psychiatric evaluation and potential medication management.        PATTIE Ludwig        This note was prepared using Dragon Medical voice recognition dictation software. As a result errors may occur. When identified these errors have been corrected. While every attempt is made to correct errors during dictation discrepancies may still exist.

## 2025-05-14 LAB
C TRACH DNA SPEC QL NAA+PROBE: NEGATIVE
N GONORRHOEA DNA SPEC QL NAA+PROBE: NEGATIVE

## 2025-05-19 ENCOUNTER — TELEPHONE (OUTPATIENT)
Age: 20
End: 2025-05-19

## 2025-05-19 NOTE — TELEPHONE ENCOUNTER
Hello,     The [facility] Navigation team has attempted to reach you regarding an order from Elizabeth Bustillos's office. We are reaching out in order to assist you in coordinating care and resources that may meet your needs. Please give our office a call at [phone]. For more immediate assistance you can contact our 24-hour help line at [phone]. We look forward to hearing from you soon.

## 2025-05-20 ENCOUNTER — PATIENT MESSAGE (OUTPATIENT)
Dept: SURGERY | Facility: CLINIC | Age: 20
End: 2025-05-20

## 2025-05-20 ENCOUNTER — TELEPHONE (OUTPATIENT)
Dept: SURGERY | Facility: CLINIC | Age: 20
End: 2025-05-20

## 2025-05-20 DIAGNOSIS — N39.0 RECURRENT UTI: Primary | ICD-10-CM

## 2025-05-20 NOTE — TELEPHONE ENCOUNTER
Called patient, verified name and . Patient says since surgery she has been nauseous all the time, she has back pain, burning and frequency with urination, and being lightheaded. I let patient know we will place order for Urine test to rule out UTI.    Patient agreed, verbalized understandings and has no further questions.

## 2025-05-20 NOTE — TELEPHONE ENCOUNTER
Patient sent below message via my chart. Please advise.     yasmeen Avila to [facility] Clinical Staff (supporting Kristina Bee MD) (Selected Message)        5/20/25  6:16 AM  [facility]  Since my procedure on the 30th of april, julee been feeling sick, constantly nauseous, and back pain, and constipation and im worried it has something to do with the surgery, should i be worried?

## 2025-05-21 ENCOUNTER — OFFICE VISIT (OUTPATIENT)
Dept: FAMILY MEDICINE CLINIC | Facility: CLINIC | Age: 20
End: 2025-05-21
Payer: COMMERCIAL

## 2025-05-21 VITALS
HEART RATE: 80 BPM | RESPIRATION RATE: 18 BRPM | TEMPERATURE: 99 F | SYSTOLIC BLOOD PRESSURE: 108 MMHG | DIASTOLIC BLOOD PRESSURE: 66 MMHG | BODY MASS INDEX: 19.88 KG/M2 | OXYGEN SATURATION: 98 % | WEIGHT: 108 LBS | HEIGHT: 62 IN

## 2025-05-21 DIAGNOSIS — R10.9 FLANK PAIN: ICD-10-CM

## 2025-05-21 DIAGNOSIS — R10.13 EPIGASTRIC PAIN: ICD-10-CM

## 2025-05-21 DIAGNOSIS — M54.50 ACUTE LEFT-SIDED LOW BACK PAIN WITHOUT SCIATICA: ICD-10-CM

## 2025-05-21 DIAGNOSIS — R11.0 NAUSEA: Primary | ICD-10-CM

## 2025-05-21 DIAGNOSIS — R14.2 BURPING: ICD-10-CM

## 2025-05-21 LAB
APPEARANCE: CLEAR
BILIRUBIN: NEGATIVE
GLUCOSE (URINE DIPSTICK): NEGATIVE MG/DL
KETONES (URINE DIPSTICK): NEGATIVE MG/DL
LEUKOCYTES: NEGATIVE
MULTISTIX LOT#: ABNORMAL NUMERIC
NITRITE, URINE: NEGATIVE
PH, URINE: 8 (ref 4.5–8)
PROTEIN (URINE DIPSTICK): NEGATIVE MG/DL
SPECIFIC GRAVITY: 1.02 (ref 1–1.03)
URINE-COLOR: YELLOW
UROBILINOGEN,SEMI-QN: 0.2 MG/DL (ref 0–1.9)

## 2025-05-21 PROCEDURE — 81003 URINALYSIS AUTO W/O SCOPE: CPT | Performed by: PHYSICIAN ASSISTANT

## 2025-05-21 PROCEDURE — 87086 URINE CULTURE/COLONY COUNT: CPT | Performed by: PHYSICIAN ASSISTANT

## 2025-05-21 PROCEDURE — 3074F SYST BP LT 130 MM HG: CPT | Performed by: PHYSICIAN ASSISTANT

## 2025-05-21 PROCEDURE — 99213 OFFICE O/P EST LOW 20 MIN: CPT | Performed by: PHYSICIAN ASSISTANT

## 2025-05-21 PROCEDURE — 3008F BODY MASS INDEX DOCD: CPT | Performed by: PHYSICIAN ASSISTANT

## 2025-05-21 PROCEDURE — 3078F DIAST BP <80 MM HG: CPT | Performed by: PHYSICIAN ASSISTANT

## 2025-05-21 RX ORDER — FAMOTIDINE 40 MG/5ML
20 POWDER, FOR SUSPENSION ORAL 2 TIMES DAILY
Qty: 150 ML | Refills: 0 | Status: SHIPPED | OUTPATIENT
Start: 2025-05-21 | End: 2025-06-20

## 2025-05-21 RX ORDER — ONDANSETRON 4 MG/1
4 TABLET, ORALLY DISINTEGRATING ORAL EVERY 8 HOURS PRN
Qty: 15 TABLET | Refills: 0 | Status: SHIPPED | OUTPATIENT
Start: 2025-05-21 | End: 2025-05-26

## 2025-05-22 NOTE — PATIENT INSTRUCTIONS
Zofran as needed up to every 8 hours for nausea   Start famotidine twice daily for reflux/burping   Close follow up with urology. Call tomorrow   Will call or MyChart with culture results   ED for worsening symptoms- stomach pain, back pain, vomiting, fever, urinary issues

## 2025-05-22 NOTE — PROGRESS NOTES
CHIEF COMPLAINT:     Chief Complaint   Patient presents with    Nausea     Sx 5/11 - Nausea, continues bilat lower back pain (L side is worse), increased belching, epigastric pain  Denies vomiting, diarrhea, dysuria, frequency, urgency, burning, lower abdominal pressure  OTC Tums       HPI:   Lore Avila is a 19 year old female who presents with symptoms of nausea since she had her right kidney stent removed 5/6/25.  Nausea has increased over the past 10 days. No vomiting. Upper abdominal pain that comes and goes. Pain is minimal but she experiences excessive burping that is very bothersome. Hx of reflux. Takes tums only. Denies diarrhea.  Lower back and flank pain on both sides but often changes sides and is not constant. Currently left lower back pain and mild right flank pain. She has hx long hx of kidney stones. Denies dysuria, urgency, or urinary frequency. She spoke with her urologist and was told to come in to rule out UTI. Denies fever. Currently has her period. Has not noted hematuria prior to period starting. Currently on treatment for yeast infection- improving.  Denies vaginal itching or discharge. Denies new sexual partners in the last 3 months, or recent unprotected sexual intercourse.     Current Medications[1]   Past Medical History[2]   Social History:  Short Social Hx on File[3]      REVIEW OF SYSTEMS:   GENERAL: Denies fever, chills, or body aches  SKIN: no rashes, no skin wounds or ulcers.  CARDIOVASCULAR: denies chest pain or palpitations  LUNGS: denies shortness of breath, cough, or wheezing  GI: See HPI  : See HPI.  Musculo: + back pain     EXAM:   /66   Pulse 80   Temp 98.5 °F (36.9 °C) (Tympanic)   Resp 18   Ht 5' 2\" (1.575 m)   Wt 108 lb (49 kg)   LMP 05/19/2025 (Exact Date)   SpO2 98%   BMI 19.75 kg/m²   Physical Exam  Constitutional:       General: She is not in acute distress.     Appearance: Normal appearance.   HENT:      Head: Normocephalic.      Nose: Nose  normal.      Mouth/Throat:      Mouth: Mucous membranes are moist.      Pharynx: Oropharynx is clear. No posterior oropharyngeal erythema.   Eyes:      Conjunctiva/sclera: Conjunctivae normal.   Cardiovascular:      Rate and Rhythm: Normal rate and regular rhythm.   Pulmonary:      Effort: Pulmonary effort is normal.      Breath sounds: Normal breath sounds.   Abdominal:      General: Abdomen is flat. Bowel sounds are normal. There is no distension.      Palpations: Abdomen is soft. There is no mass.      Tenderness: There is abdominal tenderness in the epigastric area. There is left CVA tenderness. There is no right CVA tenderness, guarding or rebound. Negative signs include McBurney's sign.   Musculoskeletal:      Cervical back: Normal range of motion and neck supple.   Lymphadenopathy:      Cervical: No cervical adenopathy.   Skin:     General: Skin is warm.      Findings: No rash.   Neurological:      Mental Status: She is alert.           Recent Results (from the past 24 hours)   URINALYSIS, AUTO, W/O SCOPE    Collection Time: 05/21/25  7:17 PM   Result Value Ref Range    Glucose Urine Negative Negative mg/dL    Bilirubin Urine Negative Negative    Ketones, UA Negative Negative - Trace mg/dL    Spec Gravity 1.020 1.005 - 1.030    Blood Urine Moderate (A) Negative    PH Urine 8.0 5.0 - 8.0    Protein Urine Negative Negative - Trace mg/dL    Urobilinogen Urine 0.2 0.2 - 1.0 mg/dL    Nitrite Urine Negative Negative    Leukocyte Esterase Urine Negative Negative    APPEARANCE Clear Clear    Color Urine Yellow Yellow    Multistix Lot# [id number] Numeric    Multistix Expiration Date 10/31/2025 Date         ASSESSMENT AND PLAN:   Lore Avila is a 19 year old female presents with UTI symptoms.    ASSESSMENT:  Encounter Diagnoses   Name Primary?    Nausea Yes    Burping     Epigastric pain     Acute left-sided low back pain without sciatica     Flank pain      Discussed [facility] limitations related to kidney stones,  epigastric pain. No labs or imaging available to fully evaluate, rule out active stone/complication. Patient has passed numerous stones. She declined visit at higher level of care at visit. Will call urology tomorrow for close follow up. Will go to the ED for worsening symptoms     PLAN: Meds as listed below.  Comfort measures as described in Patient Instructions. Will send urine culture.     Meds & Refills for this Visit:  Requested Prescriptions     Signed Prescriptions Disp Refills    ondansetron 4 MG Oral Tablet Dispersible 15 tablet 0     Sig: Take 1 tablet (4 mg total) by mouth every 8 (eight) hours as needed for Nausea.    famoTIDine 40 MG/5ML Oral Recon Susp 150 mL 0     Sig: Take 2.5 mL (20 mg total) by mouth 2 (two) times daily.       Risk and benefits of medication discussed.       The patient indicates understanding of these issues and agrees to the plan.  The patient is asked to Seek care immediately for new onset of fever, vomiting, worsening symptoms.    Patient Instructions   Zofran as needed up to every 8 hours for nausea   Start famotidine twice daily for reflux/burping   Close follow up with urology. Call tomorrow   Will call or MyChart with culture results   ED for worsening symptoms- stomach pain, back pain, vomiting, fever, urinary issues          [1]   Current Outpatient Medications   Medication Sig Dispense Refill    ondansetron 4 MG Oral Tablet Dispersible Take 1 tablet (4 mg total) by mouth every 8 (eight) hours as needed for Nausea. 15 tablet 0    famoTIDine 40 MG/5ML Oral Recon Susp Take 2.5 mL (20 mg total) by mouth 2 (two) times daily. 150 mL 0    Norethin Ace-Eth Estrad-FE (JUNEL FE 1/20) 1-20 MG-MCG Oral Tab Take 1 tablet by mouth daily. 28 tablet 12    albuterol 108 (90 Base) MCG/ACT Inhalation Aero Soln Inhale 1 puff and hold breath for 10 seconds then release.  Wait 1 full minute and repeat for second puff.  Use every 4-6 hours as needed. 18 g 3   [2]   Past Medical History:    Anxiety state    Calculus of kidney    Depression    Esophageal reflux    Extrinsic asthma, unspecified    History of UTI    Migraines    Nephrolithiasis    Scoliosis    Shortness of breath   [3]   Social History  Socioeconomic History    Marital status: Single   Tobacco Use    Smoking status: Never     Passive exposure: Yes    Smokeless tobacco: Never   Vaping Use    Vaping status: Former   Substance and Sexual Activity    Alcohol use: Yes     Comment: rarely at family partie - takes a sip.    Drug use: Not Currently     Comment: not currently    Sexual activity: Yes     Partners: Male   Other Topics Concern    Caffeine Concern Yes     Comment: rarely soda    Second-hand smoke exposure No    Alcohol/drug concerns No    Violence concerns No     Social Drivers of Health     Food Insecurity: Food Insecurity Present (4/19/2025)    NCSS - Food Insecurity     Worried About Running Out of Food in the Last Year: No     Ran Out of Food in the Last Year: Yes   Transportation Needs: No Transportation Needs (4/19/2025)    NCSS - Transportation     Lack of Transportation: No   Housing Stability: Not At Risk (4/19/2025)    NCSS - Housing/Utilities     Has Housing: Yes     Worried About Losing Housing: No     Unable to Get Utilities: No

## 2025-05-29 ENCOUNTER — HOSPITAL ENCOUNTER (OUTPATIENT)
Age: 20
Discharge: ANOTHER HOSPITAL | End: 2025-05-29
Payer: COMMERCIAL

## 2025-05-29 ENCOUNTER — APPOINTMENT (OUTPATIENT)
Dept: ULTRASOUND IMAGING | Facility: HOSPITAL | Age: 20
End: 2025-05-29
Attending: EMERGENCY MEDICINE
Payer: COMMERCIAL

## 2025-05-29 ENCOUNTER — HOSPITAL ENCOUNTER (EMERGENCY)
Facility: HOSPITAL | Age: 20
Discharge: HOME | End: 2025-05-29
Attending: EMERGENCY MEDICINE
Payer: COMMERCIAL

## 2025-05-29 ENCOUNTER — APPOINTMENT (OUTPATIENT)
Dept: GENERAL RADIOLOGY | Facility: HOSPITAL | Age: 20
End: 2025-05-29
Attending: EMERGENCY MEDICINE
Payer: COMMERCIAL

## 2025-05-29 VITALS
WEIGHT: 109.38 LBS | OXYGEN SATURATION: 98 % | TEMPERATURE: 97 F | RESPIRATION RATE: 16 BRPM | DIASTOLIC BLOOD PRESSURE: 65 MMHG | HEART RATE: 81 BPM | BODY MASS INDEX: 20.13 KG/M2 | HEIGHT: 62 IN | SYSTOLIC BLOOD PRESSURE: 104 MMHG

## 2025-05-29 VITALS
TEMPERATURE: 98 F | SYSTOLIC BLOOD PRESSURE: 101 MMHG | HEART RATE: 84 BPM | RESPIRATION RATE: 18 BRPM | DIASTOLIC BLOOD PRESSURE: 65 MMHG | OXYGEN SATURATION: 100 %

## 2025-05-29 DIAGNOSIS — N30.00 ACUTE CYSTITIS WITHOUT HEMATURIA: ICD-10-CM

## 2025-05-29 DIAGNOSIS — R10.84 ABDOMINAL PAIN, GENERALIZED: Primary | ICD-10-CM

## 2025-05-29 DIAGNOSIS — K59.00 CONSTIPATION, UNSPECIFIED CONSTIPATION TYPE: ICD-10-CM

## 2025-05-29 DIAGNOSIS — K21.9 GASTROESOPHAGEAL REFLUX DISEASE, UNSPECIFIED WHETHER ESOPHAGITIS PRESENT: ICD-10-CM

## 2025-05-29 DIAGNOSIS — R11.0 NAUSEA: ICD-10-CM

## 2025-05-29 LAB
ALBUMIN SERPL-MCNC: 4.8 G/DL (ref 3.2–4.8)
ALBUMIN/GLOB SERPL: 2.5 {RATIO} (ref 1–2)
ALP LIVER SERPL-CCNC: 51 U/L (ref 52–144)
ALT SERPL-CCNC: 10 U/L (ref 10–49)
ANION GAP SERPL CALC-SCNC: 8 MMOL/L (ref 0–18)
AST SERPL-CCNC: 15 U/L (ref ?–34)
B-HCG UR QL: NEGATIVE
BASOPHILS # BLD AUTO: 0.03 X10(3) UL (ref 0–0.2)
BASOPHILS NFR BLD AUTO: 0.4 %
BILIRUB SERPL-MCNC: 1.2 MG/DL (ref 0.3–1.2)
BILIRUB UR QL: NEGATIVE
BUN BLD-MCNC: 8 MG/DL (ref 9–23)
BUN/CREAT SERPL: 8 (ref 10–20)
CALCIUM BLD-MCNC: 9.2 MG/DL (ref 8.7–10.4)
CHLORIDE SERPL-SCNC: 105 MMOL/L (ref 98–112)
CLARITY UR: CLEAR
CO2 SERPL-SCNC: 27 MMOL/L (ref 21–32)
COLOR UR: COLORLESS
CREAT BLD-MCNC: 1 MG/DL (ref 0.55–1.02)
DEPRECATED RDW RBC AUTO: 44.6 FL (ref 35.1–46.3)
EGFRCR SERPLBLD CKD-EPI 2021: 83 ML/MIN/1.73M2 (ref 60–?)
EOSINOPHIL # BLD AUTO: 0.02 X10(3) UL (ref 0–0.7)
EOSINOPHIL NFR BLD AUTO: 0.3 %
ERYTHROCYTE [DISTWIDTH] IN BLOOD BY AUTOMATED COUNT: 13.1 % (ref 11–15)
GLOBULIN PLAS-MCNC: 1.9 G/DL (ref 2–3.5)
GLUCOSE BLD-MCNC: 96 MG/DL (ref 70–99)
GLUCOSE UR-MCNC: NORMAL MG/DL
HCT VFR BLD AUTO: 39.7 % (ref 35–48)
HGB BLD-MCNC: 13 G/DL (ref 12–16)
HGB UR QL STRIP.AUTO: NEGATIVE
IMM GRANULOCYTES # BLD AUTO: 0.01 X10(3) UL (ref 0–1)
IMM GRANULOCYTES NFR BLD: 0.1 %
KETONES UR-MCNC: NEGATIVE MG/DL
LEUKOCYTE ESTERASE UR QL STRIP.AUTO: 25
LYMPHOCYTES # BLD AUTO: 3.43 X10(3) UL (ref 1–4)
LYMPHOCYTES NFR BLD AUTO: 47.5 %
MCH RBC QN AUTO: 30.4 PG (ref 26–34)
MCHC RBC AUTO-ENTMCNC: 32.7 G/DL (ref 31–37)
MCV RBC AUTO: 92.8 FL (ref 80–100)
MONOCYTES # BLD AUTO: 0.55 X10(3) UL (ref 0.1–1)
MONOCYTES NFR BLD AUTO: 7.6 %
NEUTROPHILS # BLD AUTO: 3.18 X10 (3) UL (ref 1.5–7.7)
NEUTROPHILS # BLD AUTO: 3.18 X10(3) UL (ref 1.5–7.7)
NEUTROPHILS NFR BLD AUTO: 44.1 %
NITRITE UR QL STRIP.AUTO: NEGATIVE
OSMOLALITY SERPL CALC.SUM OF ELEC: 288 MOSM/KG (ref 275–295)
PH UR: 7 [PH] (ref 5–8)
PLATELET # BLD AUTO: 251 10(3)UL (ref 150–450)
POTASSIUM SERPL-SCNC: 3.7 MMOL/L (ref 3.5–5.1)
PROT SERPL-MCNC: 6.7 G/DL (ref 5.7–8.2)
PROT UR-MCNC: NEGATIVE MG/DL
RBC # BLD AUTO: 4.28 X10(6)UL (ref 3.8–5.3)
SODIUM SERPL-SCNC: 140 MMOL/L (ref 136–145)
SP GR UR STRIP: 1.01 (ref 1–1.03)
UROBILINOGEN UR STRIP-ACNC: NORMAL
WBC # BLD AUTO: 7.2 X10(3) UL (ref 4–11)

## 2025-05-29 PROCEDURE — 74018 RADEX ABDOMEN 1 VIEW: CPT | Performed by: EMERGENCY MEDICINE

## 2025-05-29 PROCEDURE — 81001 URINALYSIS AUTO W/SCOPE: CPT | Performed by: EMERGENCY MEDICINE

## 2025-05-29 PROCEDURE — 80053 COMPREHEN METABOLIC PANEL: CPT | Performed by: EMERGENCY MEDICINE

## 2025-05-29 PROCEDURE — 81025 URINE PREGNANCY TEST: CPT

## 2025-05-29 PROCEDURE — 36415 COLL VENOUS BLD VENIPUNCTURE: CPT

## 2025-05-29 PROCEDURE — 76705 ECHO EXAM OF ABDOMEN: CPT | Performed by: EMERGENCY MEDICINE

## 2025-05-29 PROCEDURE — 87086 URINE CULTURE/COLONY COUNT: CPT | Performed by: EMERGENCY MEDICINE

## 2025-05-29 PROCEDURE — 99215 OFFICE O/P EST HI 40 MIN: CPT | Performed by: NURSE PRACTITIONER

## 2025-05-29 PROCEDURE — 99284 EMERGENCY DEPT VISIT MOD MDM: CPT

## 2025-05-29 PROCEDURE — 85025 COMPLETE CBC W/AUTO DIFF WBC: CPT | Performed by: EMERGENCY MEDICINE

## 2025-05-29 RX ORDER — CEPHALEXIN 500 MG/1
500 CAPSULE ORAL 2 TIMES DAILY
Qty: 6 CAPSULE | Refills: 0 | Status: SHIPPED | OUTPATIENT
Start: 2025-05-29 | End: 2025-06-01

## 2025-05-29 RX ORDER — METOCLOPRAMIDE 5 MG/1
5 TABLET ORAL EVERY 8 HOURS PRN
Qty: 20 TABLET | Refills: 0 | Status: SHIPPED | OUTPATIENT
Start: 2025-05-29

## 2025-05-29 RX ORDER — PANTOPRAZOLE SODIUM 40 MG/1
40 TABLET, DELAYED RELEASE ORAL DAILY
Qty: 30 TABLET | Refills: 0 | Status: SHIPPED | OUTPATIENT
Start: 2025-05-29 | End: 2025-06-28

## 2025-05-29 NOTE — ED PROVIDER NOTES
Patient Seen in: [facility]        History  Chief Complaint   Patient presents with    Abdominal Pain    Nausea     Stated Complaint: Nausea; Lower Left Back pain going towards the front    Subjective:   HPI            This is a 19-year-old female with a history of anxiety depression asthma history of UTI kidney stones and migraines presenting with abdominal pain.  Patient states she has been having upper abdominal pain and also some lower abdominal pain and nausea for a month after recently having a urinary stent removed.  Patient states she was in contact with her urologist they told her to get a urine but she is not having any urinary symptoms but she was seen in the walk-in clinic last week and had a negative urine was given nausea medication and Pepcid thinking maybe she had acid reflux but she still having the symptoms still having the pain.  Denies any fever any back pain vomiting diarrhea or constipation.      Objective:     Past Medical History:    Anxiety state    Calculus of kidney    Depression    Esophageal reflux    Extrinsic asthma, unspecified    History of UTI    Migraines    Nephrolithiasis    Scoliosis    Shortness of breath              Past Surgical History:   Procedure Laterality Date    Other surgical history  10/30/2021    Lt RPG, Lt URS, Lt stent insertion  - Dr. Hearn; repeat stent on right side 4/2025                Social History     Socioeconomic History    Marital status: Single   Tobacco Use    Smoking status: Never     Passive exposure: Yes    Smokeless tobacco: Never   Vaping Use    Vaping status: Former   Substance and Sexual Activity    Alcohol use: Yes     Comment: rarely at family partie - takes a sip.    Drug use: Not Currently     Comment: not currently    Sexual activity: Yes     Partners: Male   Other Topics Concern    Caffeine Concern Yes     Comment: rarely soda    Second-hand smoke exposure No    Alcohol/drug concerns No    Violence concerns No     Social  Drivers of Health     Food Insecurity: Food Insecurity Present (4/19/2025)    NCSS - Food Insecurity     Worried About Running Out of Food in the Last Year: No     Ran Out of Food in the Last Year: Yes   Transportation Needs: No Transportation Needs (4/19/2025)    NCSS - Transportation     Lack of Transportation: No   Housing Stability: Not At Risk (4/19/2025)    NCSS - Housing/Utilities     Has Housing: Yes     Worried About Losing Housing: No     Unable to Get Utilities: No              Review of Systems    Positive for stated complaint: Nausea; Lower Left Back pain going towards the front  Other systems are as noted in HPI.  Constitutional and vital signs reviewed.      All other systems reviewed and negative except as noted above.                  Physical Exam    ED Triage Vitals [05/29/25 1432]   /65   Pulse 84   Resp 18   Temp 98.2 °F (36.8 °C)   Temp src Oral   SpO2 100 %   O2 Device None (Room air)       Current Vitals:   Vital Signs  BP: 101/65  Pulse: 84  Resp: 18  Temp: 98.2 °F (36.8 °C)  Temp src: Oral    Oxygen Therapy  SpO2: 100 %  O2 Device: None (Room air)            Physical Exam  Vitals and nursing note reviewed.   Constitutional:       Appearance: Normal appearance.   HENT:      Right Ear: External ear normal.      Left Ear: External ear normal.      Nose: Nose normal.      Mouth/Throat:      Mouth: Mucous membranes are moist.      Pharynx: Oropharynx is clear.   Eyes:      Conjunctiva/sclera: Conjunctivae normal.   Cardiovascular:      Rate and Rhythm: Normal rate.   Pulmonary:      Effort: Pulmonary effort is normal.   Abdominal:      Palpations: Abdomen is soft.      Tenderness: There is generalized abdominal tenderness. There is no right CVA tenderness or left CVA tenderness.   Musculoskeletal:         General: Normal range of motion.      Cervical back: Normal range of motion.   Skin:     General: Skin is warm.      Capillary Refill: Capillary refill takes less than 2 seconds.    Neurological:      General: No focal deficit present.      Mental Status: She is alert and oriented to person, place, and time.                 ED Course  Labs Reviewed - No data to display                  MDM          Medical Decision Making  19-year-old female presenting with abdominal pain and nausea.  DDx cholelithiasis versus cholecystitis versus pancreatitis versus gastritis versus GERD versus constipation versus ovarian cyst versus ovarian torsion.  Patient is tender all over her abdomen but mainly in the right upper quadrant and epigastric regions.  Discussed possible diagnosis with the patient and limitations here at the [facility] no CMP no lipase and no advanced imaging.  Discussed transfer to the emergency department and risk factors of not going such as worsening symptoms debilitating disease or death.  Patient states she understands the risk factors and will go to [facility] emergency department.    Amount and/or Complexity of Data Reviewed  Discussion of management or test interpretation with external provider(s): This patient was discussed with my attending Dr. Marino who agrees with this provider's management and plan of care.        Disposition and Plan     Clinical Impression:  1. Abdominal pain, generalized    2. Nausea         Disposition:  Ic to ed  5/29/2025  2:43 pm    Follow-up:  No follow-up provider specified.        Medications Prescribed:  Discharge Medication List as of 5/29/2025  2:43 PM                Supplementary Documentation:

## 2025-05-29 NOTE — ED PROVIDER NOTES
Patient Seen in: [facility] Emergency Department        History  Chief Complaint   Patient presents with    Abdomen/Flank Pain     Stated Complaint: abdominal pain    Subjective:   HPI            The patient is a 19-year-old female with a history of GERD, nephrolithiasis status post recent stent and removal from the right ureter, frequent recurrent urinary tract infections who presents with 1 month of feeling constipated diffuse abdominal pain worse on upper region.  She has been taking magnesium daily which has been helping but not returned to normal.  Since being constipated she then developed upper abdominal pain with frequent belching feeling bloated radiating into her chest.  She has been taking Pepcid with no relief for the last week.  The last 2 days she has also now developed dysuria again no gross hematuria.  No fevers or chills.  She was also recently prescribed anxiety medication as she thought it might be contributing to her abdominal discomfort but she has not started it yet.      Objective:     Past Medical History:    Anxiety state    Calculus of kidney    Depression    Esophageal reflux    Extrinsic asthma, unspecified    History of UTI    Migraines    Nephrolithiasis    Scoliosis    Shortness of breath              Past Surgical History:   Procedure Laterality Date    Other surgical history  10/30/2021    Lt RPG, Lt URS, Lt stent insertion  - Dr. Hearn; repeat stent on right side 4/2025                Social History     Socioeconomic History    Marital status: Single   Tobacco Use    Smoking status: Never     Passive exposure: Yes    Smokeless tobacco: Never   Vaping Use    Vaping status: Former   Substance and Sexual Activity    Alcohol use: Not Currently     Comment: rarely at family partie - takes a sip.    Drug use: Not Currently     Comment: not currently    Sexual activity: Yes     Partners: Male   Other Topics Concern    Caffeine Concern Yes     Comment: rarely soda    Second-hand  smoke exposure No    Alcohol/drug concerns No    Violence concerns No     Social Drivers of Health     Food Insecurity: Food Insecurity Present (4/19/2025)    NCSS - Food Insecurity     Worried About Running Out of Food in the Last Year: No     Ran Out of Food in the Last Year: Yes   Transportation Needs: No Transportation Needs (4/19/2025)    NCSS - Transportation     Lack of Transportation: No   Housing Stability: Not At Risk (4/19/2025)    NCSS - Housing/Utilities     Has Housing: Yes     Worried About Losing Housing: No     Unable to Get Utilities: No                                Physical Exam    ED Triage Vitals [05/29/25 1514]   /74   Pulse 73   Resp 16   Temp 97.3 °F (36.3 °C)   Temp src Temporal   SpO2 97 %   O2 Device None (Room air)       Current Vitals:   Vital Signs  BP: 104/65  Pulse: 81  Resp: 16  Temp: 97.3 °F (36.3 °C)  Temp src: Temporal  MAP (mmHg): 84    Oxygen Therapy  SpO2: 98 %  O2 Device: None (Room air)            Physical Exam  Vitals and nursing note reviewed.   Constitutional:       General: She is not in acute distress.     Appearance: Normal appearance. She is well-developed. She is not ill-appearing.   HENT:      Head: Normocephalic and atraumatic.      Mouth/Throat:      Mouth: Mucous membranes are moist.   Eyes:      Conjunctiva/sclera: Conjunctivae normal.      Pupils: Pupils are equal, round, and reactive to light.   Neck:      Vascular: No JVD.   Cardiovascular:      Rate and Rhythm: Normal rate and regular rhythm.      Heart sounds: Normal heart sounds. No murmur heard.  Pulmonary:      Effort: Pulmonary effort is normal.      Breath sounds: Normal breath sounds.   Abdominal:      General: Bowel sounds are normal. There is no distension.      Palpations: Abdomen is soft. There is no mass.      Tenderness: There is abdominal tenderness in the right upper quadrant and epigastric area. There is no right CVA tenderness, left CVA tenderness, guarding or rebound.    Musculoskeletal:         General: Normal range of motion.      Cervical back: Normal range of motion and neck supple.   Skin:     General: Skin is warm and dry.      Capillary Refill: Capillary refill takes less than 2 seconds.      Findings: No rash.   Neurological:      General: No focal deficit present.      Mental Status: She is alert and oriented to person, place, and time.      Deep Tendon Reflexes: Reflexes are normal and symmetric.   Psychiatric:         Judgment: Judgment normal.       Differential diagnosis includes constipation, GERD, gastritis, cholecystitis, UTI, kidney stone        ED Course  Labs Reviewed   COMP METABOLIC PANEL (14) - Abnormal; Notable for the following components:       Result Value    BUN 8 (*)     BUN/CREA Ratio 8.0 (*)     Alkaline Phosphatase 51 (*)     Globulin  1.9 (*)     A/G Ratio 2.5 (*)     All other components within normal limits   URINALYSIS, ROUTINE - Abnormal; Notable for the following components:    Urine Color Colorless (*)     Leukocyte Esterase Urine 25 (*)     WBC Urine 6-10 (*)     Bacteria Urine Rare (*)     Squamous Epi. Cells Few (*)     All other components within normal limits   POCT PREGNANCY URINE - Normal   CBC WITH DIFFERENTIAL WITH PLATELET   URINE CULTURE, ROUTINE                            MDM             Medical Decision Making  Patient with normal gallbladder ultrasound and lots of stool on KUB  Abdomen without point tenderness and benign on repeat exam  I think patient became constipated after her stent placement and inactivity which is now leading to belching and upper abdominal fullness and decreased appetite  Also mild UTI with history will treat culture sent  Patient to follow-up with her doctor return if worse vital signs stable prior to discharge    Problems Addressed:  Abdominal pain, generalized: acute illness or injury  Acute cystitis without hematuria: acute illness or injury  Constipation, unspecified constipation type: acute illness  or injury  Gastroesophageal reflux disease, unspecified whether esophagitis present: acute illness or injury    Amount and/or Complexity of Data Reviewed  External Data Reviewed: notes.     Details: From urologist on 4/18/2025 reviewed regarding patient's kidney stone and recent stent  Labs: ordered.  Radiology: ordered and independent interpretation performed.     Details: No hydronephrosis other results per radiologist    Risk  OTC drugs.  Prescription drug management.  Risk Details: Dosage and side effect discussed with patient        Disposition and Plan     Clinical Impression:  1. Abdominal pain, generalized    2. Acute cystitis without hematuria    3. Constipation, unspecified constipation type    4. Gastroesophageal reflux disease, unspecified whether esophagitis present         Disposition:  Discharge  5/29/2025  7:52 pm    Follow-up:  Jenaro Jaquez MD  [address] [place] [id number]  [phone]    Schedule an appointment as soon as possible for a visit in 3 day(s)            Medications Prescribed:  Current Discharge Medication List        START taking these medications    Details   Magnesium Citrate Oral Solution Take 296 mL by mouth once for 1 dose.  Qty: 296 mL, Refills: 0      pantoprazole 40 MG Oral Tab EC Take 1 tablet (40 mg total) by mouth daily.  Qty: 30 tablet, Refills: 0      metoclopramide 5 MG Oral Tab Take 1 tablet (5 mg total) by mouth every 8 (eight) hours as needed (for nausea).  Qty: 20 tablet, Refills: 0      cephALEXin 500 MG Oral Cap Take 1 capsule (500 mg total) by mouth 2 (two) times daily for 3 days.  Qty: 6 capsule, Refills: 0                   Supplementary Documentation:

## 2025-05-29 NOTE — ED INITIAL ASSESSMENT (HPI)
Pt came in for RUQ/flank pain for multiple weeks. Sent from immediate care. Reports nausea and constipation, hx of GERD. Also had kidney stone and stent on the right last month. RR even and nonlabored, speaking in full sentences, ambulatory with steady gait.

## 2025-05-29 NOTE — ED INITIAL ASSESSMENT (HPI)
Patient reports RLQ pain/back pain, and nausea x 1 month after having urinary stent removal. Denies fever, denies vomiting. Last bowel movement 3 days ago.

## 2025-05-30 NOTE — DISCHARGE INSTRUCTIONS
Return if increased pain fever vomiting   take Keflex for urinary tract infection  Take magnesium citrate 1 bottle and oral laxatives until you are having regular bowel movements  Drink plenty of water  Stop famotidine and start pantoprazole daily for acid reflux  Follow-up with your doctor

## 2025-05-31 ENCOUNTER — TELEMEDICINE (OUTPATIENT)
Dept: FAMILY MEDICINE CLINIC | Facility: CLINIC | Age: 20
End: 2025-05-31

## 2025-05-31 DIAGNOSIS — N89.8 VAGINAL DISCHARGE: ICD-10-CM

## 2025-05-31 DIAGNOSIS — R30.0 DYSURIA: Primary | ICD-10-CM

## 2025-05-31 DIAGNOSIS — K76.9 HEPATIC LESION: ICD-10-CM

## 2025-05-31 PROCEDURE — 98004 SYNCH AUDIO-VIDEO EST SF 10: CPT | Performed by: NURSE PRACTITIONER

## 2025-05-31 RX ORDER — SULFAMETHOXAZOLE AND TRIMETHOPRIM 800; 160 MG/1; MG/1
1 TABLET ORAL 2 TIMES DAILY
Qty: 6 TABLET | Refills: 0 | Status: SHIPPED | OUTPATIENT
Start: 2025-05-31 | End: 2025-06-03

## 2025-05-31 RX ORDER — FLUCONAZOLE 200 MG/1
TABLET ORAL
Qty: 2 TABLET | Refills: 0 | Status: SHIPPED | OUTPATIENT
Start: 2025-05-31

## 2025-05-31 NOTE — PROGRESS NOTES
HPI    Virtual Telephone/Video Check-In    Lore Avila verbally consents to a Virtual/Telephone Check-In visit on 05/31/25.  Patient has been referred to the [facility] website at [email or web address] to review the yearly Consent to Treat document.    Patient understands and accepts financial responsibility for any deductible, co-insurance and/or co-pays associated with this service.    Duration of the service: 10 minutes      Summary of topics discussed:     Patient presents for ER follow up.  Seen on 5/29 for abdominal symptoms.  Was diagnosed with a UTI.  Was prescribed Keflex which she is not comfortable taking since she has an allergy to penicillins.  Had a KUB completed which showed constipation.  Took laxatives and was able to have a bowel movement.  Feeling better in that regard.  With some fatigue still.  With some intermittent dysuria still and foul smelling urine despite normal urine culture.  With some vaginal discharge, as well.  Recently was treated for a yeast infection but reports that symptoms were not completely resolved.  Took 150 mg of fluconazole x 1.    Patient also had an ultrasound of the gallbladder wall in the emergency room which showed a likely hepatic hemangioma.  It was recommended that she repeat imaging in 6 months to document stability.    Review of Systems   Constitutional:  Positive for fatigue.   Genitourinary:  Positive for dysuria and vaginal discharge.   All other systems reviewed and are negative.       There were no vitals filed for this visit.  There is no height or weight on file to calculate BMI.    Health Maintenance   Topic Date Due    Meningococcal B Vaccine (1 of 2 - Standard) Never done    COVID-19 Vaccine (3 - 2024-25 season) 09/01/2024    Annual Physical  03/20/2025    DTaP,Tdap,and Td Vaccines (6 - Td or Tdap) 08/13/2025    Influenza Vaccine (Season Ended) 10/01/2025    Chlamydia Screening  05/13/2026    Annual Depression Screening  Completed    Hepatitis B  Vaccines  Completed    Hepatitis A Vaccines  Completed    MMR Vaccines  Completed    Varicella Vaccines  Completed    Meningococcal Vaccine  Completed    HPV Vaccines  Completed    Pneumococcal Vaccine: Birth to 50yrs  Aged Out       Past Medical History[1]    .Past Surgical History[2]    Family History[3]    Social History     Socioeconomic History    Marital status: Single     Spouse name: Not on file    Number of children: Not on file    Years of education: Not on file    Highest education level: Not on file   Occupational History    Not on file   Tobacco Use    Smoking status: Never     Passive exposure: Yes    Smokeless tobacco: Never   Vaping Use    Vaping status: Former   Substance and Sexual Activity    Alcohol use: Not Currently     Comment: rarely at family partie - takes a sip.    Drug use: Not Currently     Comment: not currently    Sexual activity: Yes     Partners: Male   Other Topics Concern     Service Not Asked    Blood Transfusions Not Asked    Caffeine Concern Yes     Comment: rarely soda    Occupational Exposure Not Asked    Hobby Hazards Not Asked    Sleep Concern Not Asked    Stress Concern Not Asked    Weight Concern Not Asked    Special Diet Not Asked    Back Care Not Asked    Exercise Not Asked    Bike Helmet Not Asked    Seat Belt Not Asked    Self-Exams Not Asked    Second-hand smoke exposure No    Alcohol/drug concerns No    Violence concerns No   Social History Narrative    Not on file     Social Drivers of Health     Food Insecurity: Food Insecurity Present (4/19/2025)    NCSS - Food Insecurity     Worried About Running Out of Food in the Last Year: No     Ran Out of Food in the Last Year: Yes   Transportation Needs: No Transportation Needs (4/19/2025)    NCSS - Transportation     Lack of Transportation: No   Stress: Not on file   Housing Stability: Not At Risk (4/19/2025)    NCSS - Housing/Utilities     Has Housing: Yes     Worried About Losing Housing: No     Unable to Get  Utilities: No       Current Medications[4]    Allergies:  Allergies[5]    Physical Exam  Constitutional:       Appearance: Normal appearance.   Abdominal:      General: There is no distension.   Neurological:      Mental Status: She is alert and oriented to person, place, and time.          Assessment and Plan:   Problem List Items Addressed This Visit    None  Visit Diagnoses         Dysuria    -  Primary    Relevant Medications    sulfamethoxazole-trimethoprim -160 MG Oral Tab per tablet      Hepatic lesion        Relevant Orders    US LIVER (CPT=76705)      Vaginal discharge        Relevant Medications    fluconazole 200 MG Oral Tab           Repeat ultrasound of the liver in 6 months.  Bactrim twice daily x 3 days for continuing urinary symptoms.  Fluconazole 200 mg once today and repeat in 3 days if continued symptoms.  Supportive care discussed.  Follow-up as needed for in person assessment if no relief of symptoms.  Understanding verbalized.    Discussed plan of care with patient and patient is in agreement.  All questions answered. Patient to call with questions or concerns.    Encouraged to sign up for My Chart if not already registered.        [1]   Past Medical History:   Anxiety state    Calculus of kidney    Depression    Esophageal reflux    Extrinsic asthma, unspecified    History of UTI    Migraines    Nephrolithiasis    Scoliosis    Shortness of breath   [2]   Past Surgical History:  Procedure Laterality Date    Other surgical history  10/30/2021    Lt RPG, Lt URS, Lt stent insertion  - Dr. Hearn; repeat stent on right side 4/2025   [3]   Family History  Problem Relation Age of Onset    No Known Problems Father     Cancer Paternal Grandmother     Alcohol abuse Paternal Grandfather     Suicide History Maternal Cousin 19        crashed car into a tree    Depression Cousin     Other (heart and lung transplant) Paternal Uncle     Diabetes Neg     Hypertension Neg     Heart Disorder Neg    [4]    Current Outpatient Medications   Medication Sig Dispense Refill    fluconazole 200 MG Oral Tab Take one tablet today, may repeat in 3 days if symptoms remain 2 tablet 0    sulfamethoxazole-trimethoprim -160 MG Oral Tab per tablet Take 1 tablet by mouth 2 (two) times daily for 3 days. 6 tablet 0    pantoprazole 40 MG Oral Tab EC Take 1 tablet (40 mg total) by mouth daily. 30 tablet 0    metoclopramide 5 MG Oral Tab Take 1 tablet (5 mg total) by mouth every 8 (eight) hours as needed (for nausea). 20 tablet 0    cephALEXin 500 MG Oral Cap Take 1 capsule (500 mg total) by mouth 2 (two) times daily for 3 days. 6 capsule 0    Norethin Ace-Eth Estrad-FE (JUNEL FE 1/20) 1-20 MG-MCG Oral Tab Take 1 tablet by mouth daily. 28 tablet 12    albuterol 108 (90 Base) MCG/ACT Inhalation Aero Soln Inhale 1 puff and hold breath for 10 seconds then release.  Wait 1 full minute and repeat for second puff.  Use every 4-6 hours as needed. 18 g 3   [5]   Allergies  Allergen Reactions    Penicillin G Benzathine RASH     Denies skin peeling, blistering or organ damage

## 2025-06-10 ENCOUNTER — TELEMEDICINE (OUTPATIENT)
Dept: FAMILY MEDICINE CLINIC | Facility: CLINIC | Age: 20
End: 2025-06-10
Payer: COMMERCIAL

## 2025-06-10 DIAGNOSIS — K76.9 LIVER LESION: ICD-10-CM

## 2025-06-10 DIAGNOSIS — R11.0 NAUSEA: Primary | ICD-10-CM

## 2025-06-10 DIAGNOSIS — K59.00 CONSTIPATION, UNSPECIFIED CONSTIPATION TYPE: ICD-10-CM

## 2025-06-10 DIAGNOSIS — R68.81 EARLY SATIETY: ICD-10-CM

## 2025-06-10 PROCEDURE — 98004 SYNCH AUDIO-VIDEO EST SF 10: CPT | Performed by: FAMILY MEDICINE

## 2025-06-10 NOTE — PROGRESS NOTES
This visit is conducted using Telemedicine with live, interactive video and audio during this Coronavirus pandemic.    Please note that the following visit was completed using two-way, real-time interactive audio and/or video communication.  This has been done in good flower to provide continuity of care in the best interest of the provider-patient relationship, due to the ongoing public health crisis/national emergency and because of restrictions of visitation.  There are limitations of this visit as no physical exam could be performed.  Every conscious effort was taken to allow for sufficient and adequate time.  This billing was spent on reviewing labs, medications, radiology tests and decision making.  Appropriate medical decision-making and tests are ordered as detailed in the plan of care below    LORE COCHRAN or legal guardian   verbally consents to a Virtual/Telephone/ VideoCheck-In service on 6/10/2025  Patient understands and accepts financial responsibility for any deductible, co-insurance and/or co-pays associated with this service.    Duration of the service: 12 mkn 57 secs      HPI:    Lore Cochran is a 19 year old female.      Chief Complaint   Patient presents with    Nausea     Patient has a few concerns  Symptoms started after her  ureteral stent was removed  may 6    Super nauseous nilay at night, also when she eats, no nausea medication has helped  Also having constipation, took laxatives and helped and went to ER     Seen in ER 5/29 for abdominal pain  Was diagnosed with acute cystitis, but patient never took the antibiotics , gerd, constipation     Feels worse than before the stent.    Thought it was anxiety  Went back on anxiety medication but not helping    Gets full too quick.    Was given pantoprazole but not working    Eating oatmeal/ rice    Zofran also didn't help.    No drugs/ vaping  Got cbd but has not tried it     No alcohol         ROS  A comprehensive 10 point review of systems was  completed.  Pertinent positives and negatives noted in the the HPI.      PATIENTS DENIES ANY KNOWN EXPOSURE TO ANYONE CONFIRMED FOR COVID 19.      Problem List[1]    Past Medical History[2]        MEDICATION LIST  Medications - Current[3]    ALLERGY LIST  Allergies[4]    Allergies:Allergies[5]    PHYSICAL EXAM:     Vitals signs taken at home if available:    LMP 05/19/2025       Limited examination for this telephone/ video visit due to the coronavirus emergency      General Appearance:  alert, well developed, in no acute distress  Not in acute distress, comfortably seated in own residence  Patient was speaking in complete sentences, no increased work of breathing and very coherent and alert on the phone.  Throat/Neck: normal sound to voice.   Respiratory:  non-labored. no increased work of breathing.    Skin:  normal moisture and skin texture, normal color of skin, no jaundice  Hematologic:  no excessive bruising  Psychiatric:  oriented to time, self, and place  Patient was responding to questions appropriately.        ASSESSMENT/PLAN:     Encounter Diagnoses   Name Primary?    Nausea Yes    Early satiety     Liver lesion     Constipation, unspecified constipation type        1. Nausea  Reviewed previous labs. Emergency room notes    [place] diet   Avoid nsaids   - H PYLORI BREATH TEST [[id number]][Q]  - [facility] - [facility]    2. Early satiety    - H PYLORI BREATH TEST [[id number]][Q]  - [facility]    3. Liver lesion  Us liver 6 months ordered  - [facility]    4. Constipation, unspecified constipation type  Push fluids  Steam showers/humidifier in room  Comfortable temperature in house/bedroom  Fruits/vegetable  Increase fiber intake to 25grams/day over 5-6 week period   Metamucil, colace, miralax  Increase activity  No caffeine: will dehydrate  Colonic milking  Tylenol for pain    Stop or decrease any medications that can cause constipation.    Call/return with concerns    - [facility]        Patient  reminded to practice good health and safety measures including washing hands, social distancing, covering mouth when coughing/ sneezing, avoid social meetings/ gatherings in face of this Covid 19 pandemic.    Patient verbalized understanding of plan and all questions answered to the best of my ability.    Patient to call back if any change/ worsening of symptoms.      Orders Placed This Encounter   Procedures    H PYLORI BREATH TEST [[id number]][Q]       Meds This Visit:  Requested Prescriptions      No prescriptions requested or ordered in this encounter       Imaging & Referrals:  [facility]        Jenaro Jaquez MD    6/10/2025  4:22 PM         [1]   Patient Active Problem List  Diagnosis    Other constipation    Nephrolithiasis    Hydroureteronephrosis    Lightheadedness    Dizziness    Syncope    Poor nutrition    Menstrual irregularity    SID (generalized anxiety disorder)    Dyspnea    History of nephrolithiasis    Severe recurrent major depression without psychotic features (HCC)    UTI symptoms    Dyspareunia in female    Birth control counseling    Breakthrough bleeding on Depo-Provera    UTI due to Klebsiella species    Eating disorder    Chest pain, unspecified    Cough    Liver lesion   [2]   Past Medical History:   Anxiety state    Calculus of kidney    Depression    Esophageal reflux    Extrinsic asthma, unspecified    History of UTI    Liver lesion    Migraines    Nephrolithiasis    Scoliosis    Shortness of breath   [3]   Current Outpatient Medications:     fluconazole 200 MG Oral Tab, Take one tablet today, may repeat in 3 days if symptoms remain, Disp: 2 tablet, Rfl: 0    pantoprazole 40 MG Oral Tab EC, Take 1 tablet (40 mg total) by mouth daily., Disp: 30 tablet, Rfl: 0    metoclopramide 5 MG Oral Tab, Take 1 tablet (5 mg total) by mouth every 8 (eight) hours as needed (for nausea)., Disp: 20 tablet, Rfl: 0    Norethin Ace-Eth Estrad-FE (JUNEL FE 1/20) 1-20 MG-MCG Oral Tab, Take 1 tablet by  mouth daily., Disp: 28 tablet, Rfl: 12    albuterol 108 (90 Base) MCG/ACT Inhalation Aero Soln, Inhale 1 puff and hold breath for 10 seconds then release.  Wait 1 full minute and repeat for second puff.  Use every 4-6 hours as needed., Disp: 18 g, Rfl: 3  [4]   Allergies  Allergen Reactions    Penicillin G Benzathine RASH     Denies skin peeling, blistering or organ damage   [5]   Allergies  Allergen Reactions    Penicillin G Benzathine RASH     Denies skin peeling, blistering or organ damage

## 2025-06-11 ENCOUNTER — LAB ENCOUNTER (OUTPATIENT)
Dept: LAB | Facility: HOSPITAL | Age: 20
End: 2025-06-11
Attending: INTERNAL MEDICINE
Payer: COMMERCIAL

## 2025-06-11 ENCOUNTER — OFFICE VISIT (OUTPATIENT)
Facility: CLINIC | Age: 20
End: 2025-06-11

## 2025-06-11 ENCOUNTER — TELEPHONE (OUTPATIENT)
Age: 20
End: 2025-06-11

## 2025-06-11 VITALS
SYSTOLIC BLOOD PRESSURE: 102 MMHG | WEIGHT: 108 LBS | HEIGHT: 62 IN | HEART RATE: 87 BPM | BODY MASS INDEX: 19.88 KG/M2 | DIASTOLIC BLOOD PRESSURE: 68 MMHG

## 2025-06-11 DIAGNOSIS — R11.2 NAUSEA AND VOMITING, UNSPECIFIED VOMITING TYPE: ICD-10-CM

## 2025-06-11 DIAGNOSIS — R11.0 NAUSEA: Primary | ICD-10-CM

## 2025-06-11 DIAGNOSIS — R10.10 UPPER ABDOMINAL PAIN: ICD-10-CM

## 2025-06-11 DIAGNOSIS — R10.32 BILATERAL LOWER ABDOMINAL CRAMPING: ICD-10-CM

## 2025-06-11 DIAGNOSIS — R19.4 ALTERED BOWEL HABITS: ICD-10-CM

## 2025-06-11 DIAGNOSIS — R93.2 ABNORMAL ULTRASOUND OF LIVER: ICD-10-CM

## 2025-06-11 DIAGNOSIS — R11.0 NAUSEA: ICD-10-CM

## 2025-06-11 DIAGNOSIS — R10.31 BILATERAL LOWER ABDOMINAL CRAMPING: ICD-10-CM

## 2025-06-11 DIAGNOSIS — K76.9 LIVER LESION: ICD-10-CM

## 2025-06-11 LAB
IGA SERPL-MCNC: 103 MG/DL (ref 40–350)
TSI SER-ACNC: 1.08 UIU/ML (ref 0.55–4.78)

## 2025-06-11 PROCEDURE — 3074F SYST BP LT 130 MM HG: CPT | Performed by: INTERNAL MEDICINE

## 2025-06-11 PROCEDURE — 3008F BODY MASS INDEX DOCD: CPT | Performed by: INTERNAL MEDICINE

## 2025-06-11 PROCEDURE — 84443 ASSAY THYROID STIM HORMONE: CPT

## 2025-06-11 PROCEDURE — 99204 OFFICE O/P NEW MOD 45 MIN: CPT | Performed by: INTERNAL MEDICINE

## 2025-06-11 PROCEDURE — 3078F DIAST BP <80 MM HG: CPT | Performed by: INTERNAL MEDICINE

## 2025-06-11 PROCEDURE — 36415 COLL VENOUS BLD VENIPUNCTURE: CPT

## 2025-06-11 PROCEDURE — 82784 ASSAY IGA/IGD/IGG/IGM EACH: CPT

## 2025-06-11 PROCEDURE — 86364 TISS TRNSGLTMNASE EA IG CLAS: CPT

## 2025-06-11 RX ORDER — VENLAFAXINE HYDROCHLORIDE 75 MG/1
75 TABLET, EXTENDED RELEASE ORAL
COMMUNITY
Start: 2025-05-23

## 2025-06-11 NOTE — TELEPHONE ENCOUNTER
Hello,     The [facility] team has attempted to reach you regarding an order from Elizabeth Bustillos's office. We are reaching out in order to assist you in coordinating care and resources that may meet your needs. Please give our office a call at [phone]. For more immediate assistance you can contact our 24-hour help line at [phone]. We look forward to hearing from you soon.

## 2025-06-11 NOTE — H&P
[facility] - Gastroenterology                                                                                                  Clinic History and Physical       Referring provider: Gisele    Chief Complaint   Patient presents with    Consult     PCP referred her to GI. Has really bad nausea and feels light headed when she eats. A lot of stomach cramping.        HPI:   Lore Avila is a 19 year old woman with history of BMI 20, severe recurrent major depression, generalized eating disorder, anxiety state, calculus of kidney, asthma here regarding gastrointestinal symptoms and a liver lesion.    She says she is here regarding gastrointestinal symptoms which are new over the last approximately 1 month.  Says she was recently diagnosed with a kidney stone for which she had a procedure in April with a stent placement which was then removed in early May.  She says since removal of the stent has had gastrointestinal symptoms she has not had previously.  Describes constant daily nausea.  She did have 1 episode of emesis.  Says she has trouble swallowing pills.  Believes ondansetron has not helped.  Was also in the ED and given metoclopramide which did not help.  Is also been given pantoprazole as she was told that this was GERD however she denies heartburn or regurgitation.  There is no dysphagia.  The PPI medication has not helped.  She stopped taking medication recently as nothing was helping.  She also describes upper abdominal pain in the central epigastric area as well as lower abdominal cramping.  Feels bloating and cramping with eating.  Feels like she can only eat rice.  Denies however any unintentional weight loss.  Also notes fatigue as well as sometimes feeling like she is tasting blood or iron.  She notes some recent altered bowel habits described as constipation and taking a bottle of magnesia to clear her out  though feels like she is not evacuating much.  Does feel like what she does evacuate is loose and watery.  Denies any blood in the stool.  No specific family history of gastrointestinal issues.  She denies any new medications.  She does note multiple UTIs over the last 2 to 3 years and having been on multiple antibiotic courses.    History, Medications, Allergies, ROS:      Past Medical History[1]   Past Surgical History[2]   Family Hx: Family History[3]   Social History: Short Social Hx on File[4]     Medications (Active prior to today's visit):  Current Medications[5]    Allergies:  Allergies[6]    ROS:   Systems were reviewed and were negative except as noted in the HPI    PHYSICAL EXAM:   Blood pressure 102/68, pulse 87, height 5' 2\" (1.575 m), weight 108 lb (49 kg), last menstrual period 05/19/2025.    General:awake, cooperative, no acute distress  HEENT: EOMI, no scleral icterus, MMM; oral pharnyx is without exudates or lesions  Neck: no lymphadenopathy; thyroid is not enlarged and without nodules  CV: RRR  Resp: non-labored breathing  Abd: soft, non-tender, non-distended  Ext: no lower extremity swelling  Neuro: Alert, Oriented X 3  Skin: no rashes, bruises  Psych: normal affect    Labs/Imaging:     Reviewed as noted in the HPI and A/P    ASSESSMENT/PLAN:   Lore Avila is a 19 year old woman with history of BMI 20, severe recurrent major depression, generalized eating disorder, anxiety state, calculus of kidney, asthma here regarding gastrointestinal symptoms and a liver lesion.    Review of the chart notes an Xray of the abdomen from 5/29/25 for listed left abdominal pain with findings of large amount of stool. There is a CT A/P from 4/9/25 with bilateral non-obstructing renal calculi. There is an ultrasound of the gallbladder 5/29/25 showing a normal gallbladder and a 1.2 indeterminate liver lesion. I reviewed the CMP and CBC from 5/29/25 which are unremarkable.    Unclear etiology for her recent  symptoms.  Discussed recommended initial noninvasive evaluation below including MRI given the indeterminate liver lesion.  Consider upper endoscopy as well.  She would like to hold off on trying any empiric medications at this time.    Recommend:  -MRI liver  -TSH, celiac serologies  -stool studies  -consider EGD    Orders This Visit:  No orders of the defined types were placed in this encounter.    Meds This Visit:  Requested Prescriptions      No prescriptions requested or ordered in this encounter     Imaging & Referrals:  None     Nick Isaacs MD  [facility] - Gastroenterology  6/11/2025             [1]   Past Medical History:   Anxiety state    Calculus of kidney    Depression    Esophageal reflux    Extrinsic asthma, unspecified    History of UTI    Liver lesion    Migraines    Nephrolithiasis    Scoliosis    Shortness of breath   [2]   Past Surgical History:  Procedure Laterality Date    Other surgical history  10/30/2021    Lt RPG, Lt URS, Lt stent insertion  - Dr. Hearn; repeat stent on right side 4/2025   [3]   Family History  Problem Relation Age of Onset    No Known Problems Father     Cancer Paternal Grandmother     Alcohol abuse Paternal Grandfather     Suicide History Maternal Cousin 19        crashed car into a tree    Depression Cousin     Other (heart and lung transplant) Paternal Uncle     Diabetes Neg     Hypertension Neg     Heart Disorder Neg    [4]   Social History  Socioeconomic History    Marital status: Single   Tobacco Use    Smoking status: Never     Passive exposure: Yes    Smokeless tobacco: Never   Vaping Use    Vaping status: Former   Substance and Sexual Activity    Alcohol use: Not Currently     Comment: rarely at family partie - takes a sip.    Drug use: Not Currently     Comment: not currently    Sexual activity: Yes     Partners: Male   Other Topics Concern    Caffeine Concern Yes     Comment: rarely soda    Second-hand smoke exposure No    Alcohol/drug concerns No     Violence concerns No     Social Drivers of Health     Food Insecurity: Food Insecurity Present (4/19/2025)    NCSS - Food Insecurity     Worried About Running Out of Food in the Last Year: No     Ran Out of Food in the Last Year: Yes   Transportation Needs: No Transportation Needs (4/19/2025)    NCSS - Transportation     Lack of Transportation: No   Housing Stability: Not At Risk (4/19/2025)    NCSS - Housing/Utilities     Has Housing: Yes     Worried About Losing Housing: No     Unable to Get Utilities: No   [5]   Current Outpatient Medications   Medication Sig Dispense Refill    fluconazole 200 MG Oral Tab Take one tablet today, may repeat in 3 days if symptoms remain 2 tablet 0    pantoprazole 40 MG Oral Tab EC Take 1 tablet (40 mg total) by mouth daily. 30 tablet 0    metoclopramide 5 MG Oral Tab Take 1 tablet (5 mg total) by mouth every 8 (eight) hours as needed (for nausea). 20 tablet 0    Norethin Ace-Eth Estrad-FE (JUNEL FE 1/20) 1-20 MG-MCG Oral Tab Take 1 tablet by mouth daily. 28 tablet 12    albuterol 108 (90 Base) MCG/ACT Inhalation Aero Soln Inhale 1 puff and hold breath for 10 seconds then release.  Wait 1 full minute and repeat for second puff.  Use every 4-6 hours as needed. 18 g 3   [6]   Allergies  Allergen Reactions    Penicillin G Benzathine RASH     Denies skin peeling, blistering or organ damage

## 2025-06-12 ENCOUNTER — LAB ENCOUNTER (OUTPATIENT)
Dept: LAB | Facility: HOSPITAL | Age: 20
End: 2025-06-12
Attending: INTERNAL MEDICINE
Payer: COMMERCIAL

## 2025-06-12 ENCOUNTER — PATIENT MESSAGE (OUTPATIENT)
Facility: CLINIC | Age: 20
End: 2025-06-12

## 2025-06-12 DIAGNOSIS — K76.9 LIVER LESION: ICD-10-CM

## 2025-06-12 DIAGNOSIS — R10.31 BILATERAL LOWER ABDOMINAL CRAMPING: ICD-10-CM

## 2025-06-12 DIAGNOSIS — R10.10 UPPER ABDOMINAL PAIN: ICD-10-CM

## 2025-06-12 DIAGNOSIS — R11.2 NAUSEA AND VOMITING, UNSPECIFIED VOMITING TYPE: Primary | ICD-10-CM

## 2025-06-12 DIAGNOSIS — R10.32 BILATERAL LOWER ABDOMINAL CRAMPING: ICD-10-CM

## 2025-06-12 DIAGNOSIS — R19.4 ALTERED BOWEL HABITS: ICD-10-CM

## 2025-06-12 DIAGNOSIS — R93.2 ABNORMAL ULTRASOUND OF LIVER: ICD-10-CM

## 2025-06-12 DIAGNOSIS — R11.0 NAUSEA: ICD-10-CM

## 2025-06-12 DIAGNOSIS — R93.5 ABNORMAL X-RAY OF ABDOMEN: ICD-10-CM

## 2025-06-12 DIAGNOSIS — R11.2 NAUSEA AND VOMITING, UNSPECIFIED VOMITING TYPE: ICD-10-CM

## 2025-06-12 LAB — TTG IGA SER-ACNC: <0.2 U/ML (ref ?–7)

## 2025-06-12 PROCEDURE — 87045 FECES CULTURE AEROBIC BACT: CPT

## 2025-06-12 PROCEDURE — 87493 C DIFF AMPLIFIED PROBE: CPT

## 2025-06-12 PROCEDURE — 87046 STOOL CULTR AEROBIC BACT EA: CPT

## 2025-06-12 PROCEDURE — 87427 SHIGA-LIKE TOXIN AG IA: CPT

## 2025-06-12 PROCEDURE — 87015 SPECIMEN INFECT AGNT CONCNTJ: CPT

## 2025-06-12 PROCEDURE — 83993 ASSAY FOR CALPROTECTIN FECAL: CPT

## 2025-06-13 ENCOUNTER — HOSPITAL ENCOUNTER (OUTPATIENT)
Dept: ULTRASOUND IMAGING | Facility: HOSPITAL | Age: 20
Discharge: HOME | End: 2025-06-13
Attending: UROLOGY
Payer: COMMERCIAL

## 2025-06-13 DIAGNOSIS — N20.0 NEPHROLITHIASIS: ICD-10-CM

## 2025-06-13 LAB — C DIFF TOX B STL QL: NEGATIVE

## 2025-06-13 PROCEDURE — 76770 US EXAM ABDO BACK WALL COMP: CPT | Performed by: UROLOGY

## 2025-06-15 LAB — CALPROTECTIN STL-MCNT: 8.72 ΜG/G (ref ?–50)

## 2025-06-16 NOTE — TELEPHONE ENCOUNTER
Patient is requesting to speak with an RN.  She states that her symptoms are worsening.  Patient complains of not being able to eat or drink without pain, Abdominal cramping with increased Nausea.  Please call

## 2025-06-17 ENCOUNTER — HOSPITAL ENCOUNTER (EMERGENCY)
Facility: HOSPITAL | Age: 20
Discharge: HOME | End: 2025-06-17
Attending: EMERGENCY MEDICINE
Payer: COMMERCIAL

## 2025-06-17 ENCOUNTER — PATIENT MESSAGE (OUTPATIENT)
Facility: CLINIC | Age: 20
End: 2025-06-17

## 2025-06-17 VITALS
OXYGEN SATURATION: 96 % | HEART RATE: 60 BPM | SYSTOLIC BLOOD PRESSURE: 104 MMHG | DIASTOLIC BLOOD PRESSURE: 70 MMHG | TEMPERATURE: 99 F | WEIGHT: 108 LBS | BODY MASS INDEX: 20 KG/M2 | RESPIRATION RATE: 20 BRPM

## 2025-06-17 DIAGNOSIS — K29.00 ACUTE GASTRITIS WITHOUT HEMORRHAGE, UNSPECIFIED GASTRITIS TYPE: Primary | ICD-10-CM

## 2025-06-17 LAB
ALBUMIN SERPL-MCNC: 5.2 G/DL (ref 3.2–4.8)
ALBUMIN/GLOB SERPL: 2.5 {RATIO} (ref 1–2)
ALP LIVER SERPL-CCNC: 55 U/L (ref 52–144)
ALT SERPL-CCNC: 13 U/L (ref 10–49)
ANION GAP SERPL CALC-SCNC: 9 MMOL/L (ref 0–18)
AST SERPL-CCNC: 18 U/L (ref ?–34)
ATRIAL RATE: 68 BPM
B-HCG UR QL: NEGATIVE
BASOPHILS # BLD AUTO: 0.04 X10(3) UL (ref 0–0.2)
BASOPHILS NFR BLD AUTO: 0.6 %
BILIRUB SERPL-MCNC: 1.6 MG/DL (ref 0.3–1.2)
BILIRUB UR QL: NEGATIVE
BUN BLD-MCNC: 9 MG/DL (ref 9–23)
BUN/CREAT SERPL: 10 (ref 10–20)
CALCIUM BLD-MCNC: 10.2 MG/DL (ref 8.7–10.4)
CHLORIDE SERPL-SCNC: 104 MMOL/L (ref 98–112)
CO2 SERPL-SCNC: 26 MMOL/L (ref 21–32)
COLOR UR: YELLOW
CREAT BLD-MCNC: 0.9 MG/DL (ref 0.55–1.02)
DEPRECATED RDW RBC AUTO: 43.7 FL (ref 35.1–46.3)
EGFRCR SERPLBLD CKD-EPI 2021: 94 ML/MIN/1.73M2 (ref 60–?)
EOSINOPHIL # BLD AUTO: 0.02 X10(3) UL (ref 0–0.7)
EOSINOPHIL NFR BLD AUTO: 0.3 %
ERYTHROCYTE [DISTWIDTH] IN BLOOD BY AUTOMATED COUNT: 13.1 % (ref 11–15)
GLOBULIN PLAS-MCNC: 2.1 G/DL (ref 2–3.5)
GLUCOSE BLD-MCNC: 87 MG/DL (ref 70–99)
GLUCOSE UR-MCNC: NORMAL MG/DL
HCT VFR BLD AUTO: 41.1 % (ref 35–48)
HGB BLD-MCNC: 13.9 G/DL (ref 12–16)
IMM GRANULOCYTES # BLD AUTO: 0.01 X10(3) UL (ref 0–1)
IMM GRANULOCYTES NFR BLD: 0.2 %
KETONES UR-MCNC: NEGATIVE MG/DL
LEUKOCYTE ESTERASE UR QL STRIP.AUTO: NEGATIVE
LIPASE SERPL-CCNC: 30 U/L (ref 12–53)
LYMPHOCYTES # BLD AUTO: 3.18 X10(3) UL (ref 1–4)
LYMPHOCYTES NFR BLD AUTO: 48.6 %
MCH RBC QN AUTO: 30.8 PG (ref 26–34)
MCHC RBC AUTO-ENTMCNC: 33.8 G/DL (ref 31–37)
MCV RBC AUTO: 91.1 FL (ref 80–100)
MONOCYTES # BLD AUTO: 0.41 X10(3) UL (ref 0.1–1)
MONOCYTES NFR BLD AUTO: 6.3 %
NEUTROPHILS # BLD AUTO: 2.88 X10 (3) UL (ref 1.5–7.7)
NEUTROPHILS # BLD AUTO: 2.88 X10(3) UL (ref 1.5–7.7)
NEUTROPHILS NFR BLD AUTO: 44 %
NITRITE UR QL STRIP.AUTO: NEGATIVE
OSMOLALITY SERPL CALC.SUM OF ELEC: 286 MOSM/KG (ref 275–295)
P AXIS: 4 DEGREES
P-R INTERVAL: 116 MS
PH UR: 7.5 [PH] (ref 5–8)
PLATELET # BLD AUTO: 297 10(3)UL (ref 150–450)
POTASSIUM SERPL-SCNC: 4.3 MMOL/L (ref 3.5–5.1)
PROT SERPL-MCNC: 7.3 G/DL (ref 5.7–8.2)
PROT UR-MCNC: NEGATIVE MG/DL
Q-T INTERVAL: 394 MS
QRS DURATION: 84 MS
QTC CALCULATION (BEZET): 418 MS
R AXIS: 82 DEGREES
RBC # BLD AUTO: 4.51 X10(6)UL (ref 3.8–5.3)
SODIUM SERPL-SCNC: 139 MMOL/L (ref 136–145)
SP GR UR STRIP: 1.02 (ref 1–1.03)
T AXIS: 46 DEGREES
UROBILINOGEN UR STRIP-ACNC: NORMAL
VENTRICULAR RATE: 68 BPM
WBC # BLD AUTO: 6.5 X10(3) UL (ref 4–11)

## 2025-06-17 PROCEDURE — 93005 ELECTROCARDIOGRAM TRACING: CPT

## 2025-06-17 PROCEDURE — 36415 COLL VENOUS BLD VENIPUNCTURE: CPT

## 2025-06-17 PROCEDURE — 80053 COMPREHEN METABOLIC PANEL: CPT | Performed by: EMERGENCY MEDICINE

## 2025-06-17 PROCEDURE — 99284 EMERGENCY DEPT VISIT MOD MDM: CPT

## 2025-06-17 PROCEDURE — 85025 COMPLETE CBC W/AUTO DIFF WBC: CPT | Performed by: EMERGENCY MEDICINE

## 2025-06-17 PROCEDURE — 83690 ASSAY OF LIPASE: CPT

## 2025-06-17 PROCEDURE — 80053 COMPREHEN METABOLIC PANEL: CPT

## 2025-06-17 PROCEDURE — 85025 COMPLETE CBC W/AUTO DIFF WBC: CPT

## 2025-06-17 PROCEDURE — 83690 ASSAY OF LIPASE: CPT | Performed by: EMERGENCY MEDICINE

## 2025-06-17 PROCEDURE — 81001 URINALYSIS AUTO W/SCOPE: CPT | Performed by: EMERGENCY MEDICINE

## 2025-06-17 PROCEDURE — 81025 URINE PREGNANCY TEST: CPT

## 2025-06-17 PROCEDURE — 93010 ELECTROCARDIOGRAM REPORT: CPT

## 2025-06-17 RX ORDER — LIDOCAINE HYDROCHLORIDE 20 MG/ML
10 SOLUTION OROPHARYNGEAL ONCE
Status: COMPLETED | OUTPATIENT
Start: 2025-06-17 | End: 2025-06-17

## 2025-06-17 RX ORDER — MAGNESIUM HYDROXIDE/ALUMINUM HYDROXICE/SIMETHICONE 120; 1200; 1200 MG/30ML; MG/30ML; MG/30ML
30 SUSPENSION ORAL ONCE
Status: COMPLETED | OUTPATIENT
Start: 2025-06-17 | End: 2025-06-17

## 2025-06-17 RX ORDER — SUCRALFATE 1 G/1
1 TABLET ORAL
Qty: 40 TABLET | Refills: 0 | Status: SHIPPED | OUTPATIENT
Start: 2025-06-17 | End: 2025-06-27

## 2025-06-17 NOTE — TELEPHONE ENCOUNTER
Eli (office on call),    I spoke to patient who states her symptoms are worsening. Stomach constantly cramping, hurts to eat, drink, swallow,. Holding food and water down. Cramping and nausea constantly. No vomiting. Small fever last night 99. Normal this morning.   .   8/10 pain right now, 9 when eating and 7 constantly. Can't relax having trouble sleeping.     MRI is scheduled for July 2nd or 3rd. Do you want to change this to stat to see if she can get it done sooner. I did advise if worsening/severe symptoms then to present to ED.       Please advise, thank you.    Statement Selected

## 2025-06-17 NOTE — TELEPHONE ENCOUNTER
I contacted pt to review current symptoms. She was on her way to the ER. States that abdominal cramping started about 2 hrs ago. Denies vomiting, but continues to have nausea after eating or drinking. Current pain is 8/10.   I offered dicyclomine and an antiemetic as well as putting stat order in for MRI. Pt refused, stating she doesn't know what is going on and would rather go to the ED at this time.    Follow up with Dr. Isaacs after ED visit/completion of MRI.  Thank you,  Eli BLANKENSHIP

## 2025-06-17 NOTE — ED INITIAL ASSESSMENT (HPI)
Patient presents to ED with epigastric pain that radiates to RLQ. Patient reports she has had this pain for a few months and got worse yesterday. +FRANCIA

## 2025-06-18 NOTE — TELEPHONE ENCOUNTER
Thanks Eli    Noted stool tests for infection and fecal calprotectin were normal and TSH and celiac serologies unremarkable.     I called and discussed with the patient.  Reviewed her ER visit yesterday with blood test overall unrevealing.  Discussed recommendations at this point for upper endoscopy which we will plan soon.  We did discuss the procedure including the benefits and risks not limited to bleeding, infections, perforations with low risk of complications including hospitalization or surgery.  She expressed understanding and would prefer for her to proceed with this.  I also discussed recommendations to plan for repeat CT scan at [facility] which I will order.  She should keep the planned/scheduled MRI at this point as well.  She was comfortable with this plan and will await scheduling. She was appreciative of the call.    GI staff:    Please contact the patient to schedule EGD with MAC at the [facility] or hospital for nausea/vomiting and upper abdominal pain    [facility]  6/18/2025  4:43 PM

## 2025-06-19 ENCOUNTER — OFFICE VISIT (OUTPATIENT)
Dept: FAMILY MEDICINE CLINIC | Facility: CLINIC | Age: 20
End: 2025-06-19
Payer: COMMERCIAL

## 2025-06-19 ENCOUNTER — LAB ENCOUNTER (OUTPATIENT)
Dept: LAB | Age: 20
End: 2025-06-19
Attending: FAMILY MEDICINE
Payer: COMMERCIAL

## 2025-06-19 VITALS
WEIGHT: 104 LBS | BODY MASS INDEX: 19.14 KG/M2 | DIASTOLIC BLOOD PRESSURE: 70 MMHG | HEIGHT: 62 IN | OXYGEN SATURATION: 97 % | TEMPERATURE: 98 F | HEART RATE: 83 BPM | SYSTOLIC BLOOD PRESSURE: 90 MMHG

## 2025-06-19 DIAGNOSIS — R17 ELEVATED BILIRUBIN: ICD-10-CM

## 2025-06-19 DIAGNOSIS — R68.81 EARLY SATIETY: ICD-10-CM

## 2025-06-19 DIAGNOSIS — R53.83 FATIGUE, UNSPECIFIED TYPE: ICD-10-CM

## 2025-06-19 DIAGNOSIS — R11.0 NAUSEA: Primary | ICD-10-CM

## 2025-06-19 DIAGNOSIS — R42 LIGHTHEADEDNESS: ICD-10-CM

## 2025-06-19 DIAGNOSIS — R79.0 LOW FERRITIN: ICD-10-CM

## 2025-06-19 DIAGNOSIS — K76.9 HEPATIC LESION: ICD-10-CM

## 2025-06-19 LAB
ALBUMIN SERPL-MCNC: 4.9 G/DL (ref 3.2–4.8)
ALP LIVER SERPL-CCNC: 57 U/L (ref 52–144)
ALT SERPL-CCNC: 12 U/L (ref 10–49)
AST SERPL-CCNC: 18 U/L (ref ?–34)
BILIRUB DIRECT SERPL-MCNC: 0.4 MG/DL (ref ?–0.3)
BILIRUB SERPL-MCNC: 1.6 MG/DL (ref 0.3–1.2)
DEPRECATED HBV CORE AB SER IA-ACNC: 20 NG/ML (ref 50–306)
IRON SATN MFR SERPL: 23 % (ref 15–50)
IRON SERPL-MCNC: 93 UG/DL (ref 50–170)
PROT SERPL-MCNC: 7 G/DL (ref 5.7–8.2)
TOTAL IRON BINDING CAPACITY: 407 UG/DL (ref 250–425)
TRANSFERRIN SERPL-MCNC: 316 MG/DL (ref 250–380)
VIT B12 SERPL-MCNC: 280 PG/ML (ref 211–911)
VIT D+METAB SERPL-MCNC: 29.9 NG/ML (ref 30–100)

## 2025-06-19 PROCEDURE — 99214 OFFICE O/P EST MOD 30 MIN: CPT | Performed by: FAMILY MEDICINE

## 2025-06-19 PROCEDURE — 82728 ASSAY OF FERRITIN: CPT

## 2025-06-19 PROCEDURE — 36415 COLL VENOUS BLD VENIPUNCTURE: CPT

## 2025-06-19 PROCEDURE — 3074F SYST BP LT 130 MM HG: CPT | Performed by: FAMILY MEDICINE

## 2025-06-19 PROCEDURE — 3008F BODY MASS INDEX DOCD: CPT | Performed by: FAMILY MEDICINE

## 2025-06-19 PROCEDURE — 82306 VITAMIN D 25 HYDROXY: CPT

## 2025-06-19 PROCEDURE — 83540 ASSAY OF IRON: CPT

## 2025-06-19 PROCEDURE — 82607 VITAMIN B-12: CPT

## 2025-06-19 PROCEDURE — 3078F DIAST BP <80 MM HG: CPT | Performed by: FAMILY MEDICINE

## 2025-06-19 PROCEDURE — 80076 HEPATIC FUNCTION PANEL: CPT

## 2025-06-19 PROCEDURE — 84466 ASSAY OF TRANSFERRIN: CPT

## 2025-06-19 NOTE — PROGRESS NOTES
HPI:    Patient ID: Lore Avila is a 19 year old female.       The following individual(s) verbally consented to be recorded using ambient AI listening technology and understand that they can each withdraw their consent to this listening technology at any point by asking the clinician to turn off or pause the recording:    Patient name: Lore Avila  Additional names:      History of Present Illness  Lore Avila is a 19 year old female who presents with persistent abdominal pain and nausea.    She presents for a follow-up after an ER visit due to severe abdominal pain and persistent nausea. Her symptoms have not improved since her last tele-visit on June 10th. She has experienced weight loss and continues to have nausea, especially after eating, which has not responded to medications. She has been managing her nausea with joey tea, as prescribed medications have been ineffective and difficult to ingest due to their size.    She visited a gastroenterologist on June 11th, who did not find evidence of acid reflux, as she does not experience heartburn or regurgitation. The ER provider diagnosed her with gastritis after performing blood and urine tests, but no imaging studies were performed. She was prescribed medication for gastritis but has not taken it due to the size of the pills and her work schedule.    She reports new symptoms of lightheadedness, dizziness, excessive sweating, and green diarrhea, which began four days ago. She wakes up drenched in sweat despite a cool room temperature. She also experiences a sensation of fullness after eating small amounts and describes her general condition as worsening.    She has a history of kidney stones and reports pain primarily on the right side of her abdomen, which she attributes to this condition. No issues with urination. She is scheduled for an upper endoscopy tomorrow and has an MRI of the liver and a CT scan of the abdomen and pelvis planned.    Her  recent lab work showed normal celiac testing, thyroid function, and stool cultures. However, her total bilirubin was slightly elevated, and her ferritin level was on the low end, which may indicate an iron issue. She has been feeling fatigued and lightheaded, and her vision is affected by seeing 'world stars'. She takes an iron supplement included in her birth control but is unsure if it is sufficient.    She is currently not on any psychiatric medications but sees a therapist weekly. Her mental health is affected by the uncertainty and frustration of her ongoing health issues.       H pylori breath test was not completed    HPI    Chief Complaint   Patient presents with    ER F/U     Still having abdominal pain still having the pain every day states she will be having an endoscopy tomorrow       Lightheadedness     X 3 days        Wt Readings from Last 6 Encounters:   06/19/25 104 lb (47.2 kg)   06/19/25 108 lb (49 kg)   06/17/25 108 lb (49 kg)   06/11/25 108 lb (49 kg)   05/29/25 109 lb 5.6 oz (49.6 kg)   05/21/25 108 lb (49 kg)     BP Readings from Last 3 Encounters:   06/19/25 90/70   06/17/25 104/70   06/11/25 102/68         Review of Systems   Constitutional:  Positive for fatigue. Negative for chills and fever.   Gastrointestinal:  Positive for abdominal pain and nausea. Negative for abdominal distention, anal bleeding, blood in stool, constipation, diarrhea, rectal pain and vomiting.   Genitourinary:  Negative for difficulty urinating, dysuria and frequency.   Psychiatric/Behavioral:  The patient is nervous/anxious.        BP 90/70   Pulse 83   Temp 97.9 °F (36.6 °C) (Temporal)   Ht 5' 2\" (1.575 m)   Wt 104 lb (47.2 kg)   LMP 06/17/2025   SpO2 97%   BMI 19.02 kg/m²        Current Medications[1]  Allergies:Allergies[2]   PHYSICAL EXAM:     Chief Complaint   Patient presents with    ER F/U     Still having abdominal pain still having the pain every day states she will be having an endoscopy tomorrow        Lightheadedness     X 3 days       Physical Exam  Vitals and nursing note reviewed.   Constitutional:       Appearance: She is well-developed.   Eyes:      Pupils: Pupils are equal, round, and reactive to light.   Neck:      Thyroid: No thyromegaly.   Cardiovascular:      Rate and Rhythm: Normal rate and regular rhythm.      Heart sounds: No murmur heard.  Pulmonary:      Effort: Pulmonary effort is normal.      Breath sounds: Normal breath sounds. No wheezing.   Abdominal:      Palpations: There is no mass.      Tenderness: There is generalized abdominal tenderness and tenderness in the epigastric area. There is no right CVA tenderness or left CVA tenderness.   Musculoskeletal:      Cervical back: Normal range of motion and neck supple.      Right lower leg: No edema.      Left lower leg: No edema.   Skin:     General: Skin is warm and dry.      Findings: No rash.   Neurological:      Mental Status: She is alert and oriented to person, place, and time.   Psychiatric:         Attention and Perception: Attention and perception normal.         Mood and Affect: Mood is anxious.         Behavior: Behavior normal. Behavior is cooperative.                ASSESSMENT/PLAN:   There are no diagnoses linked to this encounter.     Assessment & Plan  Abdominal Pain with Nausea and Early Satiety  Persistent abdominal pain with nausea and early satiety, severe enough to warrant an ER visit. Associated symptoms include weight loss, lightheadedness, dizziness, sweating, and diarrhea. ER diagnosed acute gastritis without imaging; sucralfate was prescribed but not yet taken. Differential diagnosis includes gastritis, H. pylori infection, or hernia. Celiac testing and stool studies were negative for common pathogens. Total bilirubin slightly elevated, possibly a lab artifact. Upper endoscopy scheduled to investigate gastritis or H. pylori infection, which may not be visible on scans.  - Perform upper endoscopy to evaluate for  gastritis, H. pylori, or other causes.  - Hold sucralfate until post-endoscopy.  - Check iron levels, ferritin, vitamin D, and B12 for nutritional deficiencies.  - Encourage hydration before lab tests.    Lightheadedness and Dizziness  Persistent lightheadedness and dizziness potentially related to nutritional deficiencies. Previous ferritin levels were low, suggesting possible iron deficiency. Vitamin D deficiency may contribute to fatigue and dizziness.  - Check ferritin, vitamin D, and B12 levels for deficiencies.  - Encourage adequate hydration.    Mental Health Concerns  Current health issues affecting mental health, causing fear and frustration. She attends weekly therapy but has had negative experiences with psychiatrists and is not on psychiatric medications.  - Continue weekly therapy sessions.  - Provide mental health support as needed.    Follow-up  Ensure comprehensive evaluation and management of symptoms.  - Follow up post-upper endoscopy to discuss results and next steps with GI.  - Complete lab tests for iron, ferritin, vitamin D, and B12 levels.    Recording duration: 13 minutes       Orders Placed This Encounter   Procedures    Ferritin    Hepatic Function Panel (7) [E]    Vitamin D [E]    Iron And Tibc    Vitamin B12         The above note was creating using Dragon speech recognition technology. Please excuse any typos    Meds This Visit:  Requested Prescriptions      No prescriptions requested or ordered in this encounter       Imaging & Referrals:  None       ID#[id number]         [1]   Current Outpatient Medications   Medication Sig Dispense Refill    sucralfate 1 g Oral Tab Take 1 tablet (1 g total) by mouth 4 (four) times daily before meals and nightly for 10 days. 40 tablet 0    Norethin Ace-Eth Estrad-FE (JUNEL FE 1/20) 1-20 MG-MCG Oral Tab Take 1 tablet by mouth daily. 28 tablet 12    albuterol 108 (90 Base) MCG/ACT Inhalation Aero Soln Inhale 1 puff and hold breath for 10 seconds then release.   Wait 1 full minute and repeat for second puff.  Use every 4-6 hours as needed. 18 g 3   [2]   Allergies  Allergen Reactions    Penicillin G Benzathine RASH     Denies skin peeling, blistering or organ damage

## 2025-06-19 NOTE — ED PROVIDER NOTES
Patient Seen in: [facility]    History     Chief Complaint   Patient presents with    Abdomen/Flank Pain     Stated Complaint: abdominal pain, rib pain    HPI    Patient complains of l upper  abdominal pain that began weeks ago.  Pain described as aching.  Pain rated as 6/10.  Modifying factors include: worse with eating..  seen for similar few weeks ago had labs imaging, followed up with GI.  No fever.  + anorexia          Past Medical History[1]    Past Surgical History[2]         Family History[3]    Short Social Hx on File[4]    Review of Systems    Positive for stated complaint: abdominal pain, rib pain  Other systems are as noted in HPI.  Constitutional and vital signs reviewed.      All other systems reviewed and negative except as noted above.    PSFH elements reviewed from today and agreed except as otherwise stated in HPI.    Physical Exam     ED Triage Vitals [06/17/25 1051]   /70   Pulse 60   Resp 20   Temp 99 °F (37.2 °C)   Temp src Oral   SpO2 96 %   O2 Device None (Room air)       Current:/70   Pulse 60   Temp 99 °F (37.2 °C) (Oral)   Resp 20   Wt 49 kg   LMP 05/19/2025   SpO2 96%   BMI 19.75 kg/m²   Pulse ox nl        Physical Exam  General Appearance: alert, no distress  Eyes: pupils equal and round no pallor or injection  ENT, Mouth: mucous membranes moist  Respiratory: there are no retractions, lungs are clear to auscultation  Cardiovascular: regular rate and rhythm    Gastrointestinal: soft tender luq no guarding no peritoneal signs  Neurological: II-XII grossly intact  no focal deficits  Skin: warm and dry, no rashes.  Musculoskeletal: neck is supple non tender        Extremities are symmetrical, full range of motion  Psychiatric: patient is pleasant, there is no agitation    DIFFERENTIAL DIAGNOSIS: After history and physical exam differential diagnosis was considered for  gastritis vs. Esophagitis vs. enteritisi          ED Course     Labs  Reviewed   COMP METABOLIC PANEL (14) - Abnormal; Notable for the following components:       Result Value    Bilirubin, Total 1.6 (*)     Albumin 5.2 (*)     A/G Ratio 2.5 (*)     All other components within normal limits   URINALYSIS WITH CULTURE REFLEX - Abnormal; Notable for the following components:    Clarity Urine Turbid (*)     Blood Urine 2+ (*)     Squamous Epi. Cells Few (*)     All other components within normal limits   LIPASE - Normal   POCT PREGNANCY URINE - Normal   CBC WITH DIFFERENTIAL WITH PLATELET   RAINBOW DRAW LAVENDER   RAINBOW DRAW LIGHT GREEN   RAINBOW DRAW BLUE   RAINBOW DRAW GOLD     EKG    Rate, intervals and axes as noted on EKG Report.  Rate: 68  Rhythm: Sinus Rhythm  Reading: Normal intervals, normal EKG             MDM         Cardiac Monitor:   Pulse Readings from Last 1 Encounters:   06/17/25 60   , sinus, 66  interpreted by me.    Radiology findings:  I personally reviewed the images. No results found.          Medical Decision Making  Non surgical abdomen.  Reviewed previous ER and GI visit notes.      Problems Addressed:  Acute gastritis without hemorrhage, unspecified gastritis type: acute illness or injury    Amount and/or Complexity of Data Reviewed  Labs: ordered. Decision-making details documented in ED Course.    Risk  Prescription drug management.            Disposition and Plan     Clinical Impression:  1. Acute gastritis without hemorrhage, unspecified gastritis type        Disposition:  Discharge    Follow-up:  Jenaro Jaquez MD  [address] [place] [id number] [phone]    Follow up      Johnny Barreto MD  [address] [place], [place] [id number] [phone]    Follow up        Medications Prescribed:  Discharge Medication List as of 6/17/2025  1:01 PM        START taking these medications    Details   sucralfate 1 g Oral Tab Take 1 tablet (1 g total) by mouth 4 (four) times daily before meals and nightly for 10 days., Normal, Disp-40 tablet, R-0                             [1]   Past Medical History:   Anxiety state    Calculus of kidney    Depression    Esophageal reflux    Extrinsic asthma, unspecified    History of UTI    Liver lesion    Migraines    Nephrolithiasis    Scoliosis    Shortness of breath   [2]   Past Surgical History:  Procedure Laterality Date    Other surgical history  10/30/2021    Lt RPG, Lt URS, Lt stent insertion  - Dr. Hearn; repeat stent on right side 4/2025   [3]   Family History  Problem Relation Age of Onset    No Known Problems Father     Cancer Paternal Grandmother     Alcohol abuse Paternal Grandfather     Suicide History Maternal Cousin 19        crashed car into a tree    Depression Cousin     Other (heart and lung transplant) Paternal Uncle     Diabetes Neg     Hypertension Neg     Heart Disorder Neg    [4]   Social History  Socioeconomic History    Marital status: Single   Tobacco Use    Smoking status: Never     Passive exposure: Yes    Smokeless tobacco: Never   Vaping Use    Vaping status: Former   Substance and Sexual Activity    Alcohol use: Not Currently     Comment: rarely at family partie - takes a sip.    Drug use: Not Currently     Comment: not currently    Sexual activity: Yes     Partners: Male   Other Topics Concern    Caffeine Concern Yes     Comment: rarely soda    Second-hand smoke exposure No    Alcohol/drug concerns No    Violence concerns No     Social Drivers of Health     Food Insecurity: Food Insecurity Present (4/19/2025)    NCSS - Food Insecurity     Worried About Running Out of Food in the Last Year: No     Ran Out of Food in the Last Year: Yes   Transportation Needs: No Transportation Needs (4/19/2025)    NCSS - Transportation     Lack of Transportation: No   Housing Stability: Not At Risk (4/19/2025)    NCSS - Housing/Utilities     Has Housing: Yes     Worried About Losing Housing: No     Unable to Get Utilities: No

## 2025-06-20 ENCOUNTER — ANESTHESIA EVENT (OUTPATIENT)
Dept: ENDOSCOPY | Facility: HOSPITAL | Age: 20
End: 2025-06-20
Payer: COMMERCIAL

## 2025-06-20 ENCOUNTER — ANESTHESIA (OUTPATIENT)
Dept: ENDOSCOPY | Facility: HOSPITAL | Age: 20
End: 2025-06-20
Payer: COMMERCIAL

## 2025-06-20 ENCOUNTER — HOSPITAL ENCOUNTER (OUTPATIENT)
Facility: HOSPITAL | Age: 20
Setting detail: HOSPITAL OUTPATIENT SURGERY
Discharge: HOME | End: 2025-06-20
Attending: INTERNAL MEDICINE | Admitting: INTERNAL MEDICINE
Payer: COMMERCIAL

## 2025-06-20 VITALS
WEIGHT: 108 LBS | HEART RATE: 62 BPM | DIASTOLIC BLOOD PRESSURE: 70 MMHG | RESPIRATION RATE: 17 BRPM | OXYGEN SATURATION: 97 % | BODY MASS INDEX: 19.88 KG/M2 | SYSTOLIC BLOOD PRESSURE: 98 MMHG | HEIGHT: 62 IN

## 2025-06-20 DIAGNOSIS — R10.10 UPPER ABDOMINAL PAIN: ICD-10-CM

## 2025-06-20 DIAGNOSIS — R11.2 NAUSEA AND VOMITING, UNSPECIFIED VOMITING TYPE: ICD-10-CM

## 2025-06-20 PROBLEM — R10.9 ABDOMINAL PAIN: Status: ACTIVE | Noted: 2025-06-20

## 2025-06-20 LAB — B-HCG UR QL: NEGATIVE

## 2025-06-20 PROCEDURE — 43239 EGD BIOPSY SINGLE/MULTIPLE: CPT | Performed by: INTERNAL MEDICINE

## 2025-06-20 RX ORDER — SODIUM CHLORIDE, SODIUM LACTATE, POTASSIUM CHLORIDE, CALCIUM CHLORIDE 600; 310; 30; 20 MG/100ML; MG/100ML; MG/100ML; MG/100ML
INJECTION, SOLUTION INTRAVENOUS CONTINUOUS
Status: DISCONTINUED | OUTPATIENT
Start: 2025-06-20 | End: 2025-06-20

## 2025-06-20 RX ORDER — LIDOCAINE HYDROCHLORIDE 10 MG/ML
INJECTION, SOLUTION EPIDURAL; INFILTRATION; INTRACAUDAL; PERINEURAL AS NEEDED
Status: DISCONTINUED | OUTPATIENT
Start: 2025-06-20 | End: 2025-06-20 | Stop reason: SURG

## 2025-06-20 RX ORDER — NALOXONE HYDROCHLORIDE 0.4 MG/ML
0.08 INJECTION, SOLUTION INTRAMUSCULAR; INTRAVENOUS; SUBCUTANEOUS ONCE AS NEEDED
Status: DISCONTINUED | OUTPATIENT
Start: 2025-06-20 | End: 2025-06-20

## 2025-06-20 RX ADMIN — LIDOCAINE HYDROCHLORIDE 100 MG: 10 INJECTION, SOLUTION EPIDURAL; INFILTRATION; INTRACAUDAL; PERINEURAL at 08:23:00

## 2025-06-20 RX ADMIN — SODIUM CHLORIDE, SODIUM LACTATE, POTASSIUM CHLORIDE, CALCIUM CHLORIDE: 600; 310; 30; 20 INJECTION, SOLUTION INTRAVENOUS at 08:21:00

## 2025-06-20 NOTE — H&P
History & Physical Examination    Patient Name: Lore Avila  MRN: [id number]  CSN: [id number]  YOB: 2005    Diagnosis: abdominal pain, loose stools, early satiety, nausea, trouble swallowing pills    Prescriptions Prior to Admission[1]  Current Hospital Medications[2]    Allergies: Allergies[3]    Past Medical History[4]  Past Surgical History[5]  Family History[6]  Social History     Tobacco Use    Smoking status: Never     Passive exposure: Yes    Smokeless tobacco: Never   Substance Use Topics    Alcohol use: Not Currently     Comment: rarely at family partie - takes a sip.       SYSTEM Check if Physical Exam is Normal If not normal, please explain:   HEENT [ X]    NECK  [ X]    HEART [ X]    LUNGS [ X]    ABDOMEN [ X]    EXTREMITIES [ X]      General:awake, cooperative, no acute distress  HEENT: EOMI, no scleral icterus, MMM; oral pharnyx is without exudates or lesions  Neck: no lymphadenopathy; thyroid is not enlarged and without nodules  CV: RRR  Resp: non-labored breathing  Abd: soft, non-tender, non-distended  Ext: no lower extremity swelling  Neuro: Alert, Oriented X 3  Skin: no rashes, bruises  Psych: normal affect  I have discussed the risks and benefits and alternatives of the procedure with the patient/family.  They understand and agreed to proceed with plan of care.   Nick Isaacs MD  [facility] - Gastroenterology  6/20/2025  8:16 AM                   [1]   Medications Prior to Admission   Medication Sig Dispense Refill Last Dose/Taking    sucralfate 1 g Oral Tab Take 1 tablet (1 g total) by mouth 4 (four) times daily before meals and nightly for 10 days. 40 tablet 0 Taking    Norethin Ace-Eth Estrad-FE (JUNEL FE 1/20) 1-20 MG-MCG Oral Tab Take 1 tablet by mouth daily. 28 tablet 12 6/17/2025    albuterol 108 (90 Base) MCG/ACT Inhalation Aero Soln Inhale 1 puff and hold breath for 10 seconds then release.  Wait 1 full minute and repeat for second puff.  Use every 4-6 hours  as needed. 18 g 3 Taking   [2]   Current Facility-Administered Medications   Medication Dose Route Frequency    lactated ringers infusion   Intravenous Continuous   [3]   Allergies  Allergen Reactions    Penicillin G Benzathine RASH     Denies skin peeling, blistering or organ damage   [4]   Past Medical History:   Anxiety state    Asthma (HCC)    Calculus of kidney    Depression    Esophageal reflux    Extrinsic asthma, unspecified    History of UTI    Hx of motion sickness    Liver lesion    Migraines    Nephrolithiasis    PONV (postoperative nausea and vomiting)    Scoliosis    Shortness of breath   [5]   Past Surgical History:  Procedure Laterality Date    Other surgical history  10/30/2021    Lt RPG, Lt URS, Lt stent insertion  - Dr. Hearn; repeat stent on right side 4/2025   [6]   Family History  Problem Relation Age of Onset    No Known Problems Father     Cancer Paternal Grandmother     Alcohol abuse Paternal Grandfather     Suicide History Maternal Cousin 19        crashed car into a tree    Depression Cousin     Other (heart and lung transplant) Paternal Uncle     Diabetes Neg     Hypertension Neg     Heart Disorder Neg

## 2025-06-20 NOTE — ANESTHESIA POSTPROCEDURE EVALUATION
Patient: Lore Avila    Procedure Summary       Date: 06/20/25 Room / Location: [facility] ENDOSCOPY 01 / [facility] ENDOSCOPY; [facility]    Anesthesia Start: 0821 Anesthesia Stop: 0838    Procedures:       ESOPHAGOGASTRODUODENOSCOPY      PROCEDURE MAC Diagnosis:       Upper abdominal pain      Nausea and vomiting, unspecified vomiting type      (Normal)    Scheduled Providers: Nick Isaacs MD Anesthesiologist: Rosalba Engel CRNA    Anesthesia Type: MAC ASA Status: 2            Anesthesia Type: MAC    Vitals Value Taken Time   BP 99/42 06/20/25 08:37   Temp 97 06/20/25 08:38   Pulse 75 06/20/25 08:37   Resp 21 06/20/25 08:37   SpO2 97 % 06/20/25 08:37   Vitals shown include unfiled device data.    [facility] AN Post Evaluation:   Patient Evaluated in PACU  Patient Participation: complete - patient participated  Level of Consciousness: awake and alert  Pain Score: 0  Pain Management: adequate  Airway Patency:patent  Yes    Nausea/Vomiting: none  Cardiovascular Status: acceptable  Respiratory Status: acceptable  Postoperative Hydration acceptable      Rosalba Engel CRNA  6/20/2025 8:38 AM

## 2025-06-20 NOTE — ANESTHESIA PREPROCEDURE EVALUATION
Anesthesia PreOp Note    HPI:     Lore Avila is a 19 year old female who presents for preoperative consultation requested by: Nick Isaacs MD    Date of Surgery: 6/20/2025    Procedure(s):  ESOPHAGOGASTRODUODENOSCOPY  Indication: Upper abdominal pain / Nausea and vomiting, unspecified vomiting type    Relevant Problems   No relevant active problems       NPO:  Last Liquid Consumption Date: 06/19/25  Last Liquid Consumption Time: 2000  Last Solid Consumption Date: 06/19/25  Last Solid Consumption Time: 1900  Last Liquid Consumption Date: 06/19/25          History Review:  Patient Active Problem List    Diagnosis Date Noted    Liver lesion 06/10/2025    UTI due to Klebsiella species 01/17/2024    Eating disorder 01/17/2024    Breakthrough bleeding on Depo-Provera 11/14/2023    UTI symptoms 04/24/2023    Dyspareunia in female 04/24/2023    Birth control counseling 04/24/2023    Severe recurrent major depression without psychotic features (HCC) 12/11/2022    SID (generalized anxiety disorder) 12/08/2022    Dyspnea 12/08/2022    History of nephrolithiasis 12/08/2022    Chest pain, unspecified 11/10/2022    Lightheadedness 10/07/2022    Dizziness 10/07/2022    Syncope 10/07/2022    Poor nutrition 10/07/2022    Menstrual irregularity 10/07/2022    Other constipation 10/20/2021    Nephrolithiasis 10/20/2021    Hydroureteronephrosis 10/20/2021    Cough 12/03/2013       Past Medical History[1]    Past Surgical History[2]    Prescriptions Prior to Admission[3]  Current Medications and Prescriptions Ordered in Epic[4]    Allergies[5]    Family History[6]  Social Hx on file[7]    Available pre-op labs reviewed.  Lab Results   Component Value Date    WBC 6.5 06/17/2025    RBC 4.51 06/17/2025    HGB 13.9 06/17/2025    HCT 41.1 06/17/2025    MCV 91.1 06/17/2025    MCH 30.8 06/17/2025    MCHC 33.8 06/17/2025    RDW 13.1 06/17/2025    .0 06/17/2025    URINEPREG Negative 06/20/2025     Lab Results   Component Value  Date     06/17/2025    K 4.3 06/17/2025     06/17/2025    CO2 26.0 06/17/2025    BUN 9 06/17/2025    CREATSERUM 0.90 06/17/2025    GLU 87 06/17/2025    CA 10.2 06/17/2025          Vital Signs:  Body mass index is 19.75 kg/m².   height is 1.575 m (5' 2\") and weight is 49 kg (108 lb). Her blood pressure is 100/68 and her pulse is 78. Her respiration is 15 and oxygen saturation is 99%.   Vitals:    06/19/25 0933 06/20/25 0738   BP:  100/68   Pulse:  78   Resp:  15   SpO2:  99%   Weight: 49 kg (108 lb)    Height: 1.575 m (5' 2\")         Anesthesia Evaluation     Patient summary reviewed and Nursing notes reviewed    History of anesthetic complications   Airway   Mallampati: I  TM distance: >3 FB  Neck ROM: limited  Dental - Dentition appears grossly intact     Pulmonary - normal exam    breath sounds clear to auscultation  (+) asthma  (-) shortness of breath  Cardiovascular - negative ROS  Exercise tolerance: good    Rhythm: regular  Rate: normal    Neuro/Psych    (+)  anxiety/panic attacks,  depression      GI/Hepatic/Renal    (+) GERD, liver disease    Endo/Other - negative ROS   Abdominal  - normal exam                 Anesthesia Plan:   ASA:  2  Plan:   MAC  Post-op Pain Management: IV analgesics  Informed Consent Plan and Risks Discussed With:  Patient  Use of Blood Products Discussed With:  Patient  Discussed plan with:  CRNA and surgeon      I have informed Lore Avila and/or legal guardian or family member of the nature of the anesthetic plan, benefits, risks including possible dental damage if relevant, major complications, and any alternative forms of anesthetic management.   All of the patient's questions were answered to the best of my ability. The patient desires the anesthetic management as planned.  Rosalba Engel CRNA  6/20/2025 7:43 AM  Present on Admission:  **None**           [1]   Past Medical History:   Anxiety state    Asthma (HCC)    Calculus of kidney    Depression    Esophageal  reflux    Extrinsic asthma, unspecified    History of UTI    Hx of motion sickness    Liver lesion    Migraines    Nephrolithiasis    PONV (postoperative nausea and vomiting)    Scoliosis    Shortness of breath   [2]   Past Surgical History:  Procedure Laterality Date    Other surgical history  10/30/2021    Lt RPG, Lt URS, Lt stent insertion  - Dr. Hearn; repeat stent on right side 4/2025   [3]   Medications Prior to Admission   Medication Sig Dispense Refill Last Dose/Taking    sucralfate 1 g Oral Tab Take 1 tablet (1 g total) by mouth 4 (four) times daily before meals and nightly for 10 days. 40 tablet 0 Taking    Norethin Ace-Eth Estrad-FE (JUNEL FE 1/20) 1-20 MG-MCG Oral Tab Take 1 tablet by mouth daily. 28 tablet 12 6/17/2025    albuterol 108 (90 Base) MCG/ACT Inhalation Aero Soln Inhale 1 puff and hold breath for 10 seconds then release.  Wait 1 full minute and repeat for second puff.  Use every 4-6 hours as needed. 18 g 3 Taking   [4]   Current Facility-Administered Medications Ordered in Epic   Medication Dose Route Frequency Provider Last Rate Last Admin    lactated ringers infusion   Intravenous Continuous Nick Isaacs MD         No current [facility]-ordered outpatient medications on file.   [5]   Allergies  Allergen Reactions    Penicillin G Benzathine RASH     Denies skin peeling, blistering or organ damage   [6]   Family History  Problem Relation Age of Onset    No Known Problems Father     Cancer Paternal Grandmother     Alcohol abuse Paternal Grandfather     Suicide History Maternal Cousin 19        crashed car into a tree    Depression Cousin     Other (heart and lung transplant) Paternal Uncle     Diabetes Neg     Hypertension Neg     Heart Disorder Neg    [7]   Social History  Socioeconomic History    Marital status: Single   Tobacco Use    Smoking status: Never     Passive exposure: Yes    Smokeless tobacco: Never   Vaping Use    Vaping status: Former   Substance and Sexual Activity    Alcohol  use: Not Currently     Comment: rarely at family partie - takes a sip.    Drug use: Not Currently     Comment: not currently    Sexual activity: Yes     Partners: Male   Other Topics Concern    Caffeine Concern Yes     Comment: rarely soda    Second-hand smoke exposure No    Alcohol/drug concerns No    Violence concerns No

## 2025-06-20 NOTE — DISCHARGE INSTRUCTIONS
Home Care Instructions for Gastroscopy with Sedation    Diet:  - Resume your regular diet as tolerated unless otherwise instructed.  - Start with light meals to minimize bloating.  - Do not drink alcohol today.    Medication:  - If you have questions about resuming your normal medications, please contact your Primary Care Physician.    Activities:  - Take it easy today. Do not return to work today.  - Do not drive today.  - Do not operate any machinery today (including kitchen equipment).      Gastroscopy:  - You may have a sore throat for 2-3 days following the exam. This is normal. Gargling with warm salt water (1/2 tsp salt to 1 glass warm water) or using throat lozenges will help.  - If you experience any sharp pain in your neck, abdomen or chest, vomiting of blood, oral temperature over 100 degrees Fahrenheit, light-headedness or dizziness, or any other problems, contact your doctor.    **If unable to reach your doctor, please go to the [facility] Emergency Room**    - Your referring physician will receive a full report of your examination.  - If you do not hear from your doctor's office within two weeks of your biopsy, please call them for your results.    You may be able to see your laboratory results in [employer] between 4 and 7 business days.  In some cases, your physician may not have viewed the results before they are released to [employer].  If you have questions regarding your results contact the physician who ordered the test/exam by phone or via [employer] by choosing \"Ask a Medical Question.\"

## 2025-06-20 NOTE — OPERATIVE REPORT
Esophagogastroduodenoscopy (EGD) Report    Lore Avila     2005 Age 19 year old   PCP Jenaro Jaquez MD Endoscopist Nick Isaacs MD     Date of procedure: 25    Procedure: EGD w/ biopsy     Pre-operative diagnosis: abdominal pain, loose stools, early satiety, nausea, trouble swallowing pills     Post-operative diagnosis: see impression    Sedation:  monitored anesthesia care (MAC)    Consent: We discussed the risks/benefits and alternatives to this procedure, as well as the planned sedation. Informed consent was obtained from the patient after the risks of the procedure were discussed, including but not limited to bleeding, perforation, aspiration, infection, or possibility of a missed lesion as well as the risks of anesthesia including but not limited to cardiopulmonary complications. The patient signed informed consent and elected to proceed with EGD with intervention [i.e. Biopsy, control of bleeding, dilatation, polypectomy, endoscopic mucosal resection, etc.] as indicated.    EGD procedure: The patient was placed in the left lateral decubitus position and begun on continuous blood pressure pulse oximetry and EKG monitoring and this was maintained throughout the procedure. Once an adequate level of sedation was obtained a bite block was placed. Then the lubricated tip of the [id number] diagnostic video upper endoscope was carefully inserted and advanced using direct visualization into the posterior pharynx and ultimately into the esophagus, stomach, and duodenum. Air was then withdrawn and the endoscope was removed. The patient tolerated the procedure well.    Estimated blood loss: insignificant    Specimens collected:  esophageal, gastric, and duodenal biopsies    Complications: none    EGD findings:      1. Esophagus: The squamocolumnar junction was noted and appeared regular. The esophageal mucosa appeared unremarkable throughout. Multiple cold forceps biopsies were obtained in  the mid/proximal esophagus.  2. Stomach: The stomach distended normally. Normal rugal folds were seen. The pylorus was patent. The gastric mucosa appeared unremarkable. Multiple cold forceps biopsies were obtained randomly. Retroflexion revealed a normal fundus and cardia.   3. Duodenum: The duodenal mucosa appeared normal in the 1st and 2nd portion of the duodenum. Multiple cold forceps biopsies were obtained randomly.    Impression:  Unremarkable/normal upper endoscopy  S/p biopsies randomly obtained in the esophagus, stomach, and duodenum    Recommend:  Await pathology results  Continue current medications  Schedule your CT scan as previously ordered [phone]  Follow up in GI clinic    >>>If biopsies were performed and you have not received your pathology results either by phone or letter within 2 weeks, please call our office at [phone].    [facility]  6/20/2025

## 2025-07-07 ENCOUNTER — TELEPHONE (OUTPATIENT)
Facility: CLINIC | Age: 20
End: 2025-07-07

## 2025-07-07 NOTE — TELEPHONE ENCOUNTER
Dr. Isaacs     Spoke with Lore and reviewed results/recommendations outlined below.     Patient works on Wednesdays and cannot make the offered appointment for 7/16.     Available any Thursday if MD has sooner availability to accommodate her work schedule.     Is it ok to offer Thursday, 7/17 at 9am?     Thank you!

## 2025-07-07 NOTE — TELEPHONE ENCOUNTER
----- Message from Nick Isaacs sent at 7/1/2025  8:49 AM CDT -----  GI staff:    Please let the patient know her CT scan overall was unremarkable without any specific abnormalities. Please offer/schedule a follow up with me in the office 7/16 at 1:30    Thanks    [facility]  7/1/2025  8:49 AM    ----- Message -----  From: Lise Garcia In  Sent: 6/30/2025  10:54 AM CDT  To: Nick Isaacs MD

## 2025-07-08 ENCOUNTER — PATIENT MESSAGE (OUTPATIENT)
Dept: OBGYN CLINIC | Facility: CLINIC | Age: 20
End: 2025-07-08

## 2025-07-08 ENCOUNTER — OFFICE VISIT (OUTPATIENT)
Dept: SURGERY | Facility: CLINIC | Age: 20
End: 2025-07-08

## 2025-07-08 DIAGNOSIS — N20.0 NEPHROLITHIASIS: Primary | ICD-10-CM

## 2025-07-08 PROBLEM — I87.1 NUTCRACKER PHENOMENON OF RENAL VEIN: Status: ACTIVE | Noted: 2025-07-08

## 2025-07-08 PROCEDURE — G2211 COMPLEX E/M VISIT ADD ON: HCPCS | Performed by: UROLOGY

## 2025-07-08 PROCEDURE — 99213 OFFICE O/P EST LOW 20 MIN: CPT | Performed by: UROLOGY

## 2025-07-08 RX ORDER — DROSPIRENONE 4 MG/1
1 TABLET, FILM COATED ORAL
Qty: 28 TABLET | Refills: 2 | Status: SHIPPED | OUTPATIENT
Start: 2025-07-08 | End: 2026-07-08

## 2025-07-08 NOTE — TELEPHONE ENCOUNTER
Spoke with Lore.     Accepted appointment offered for 7/10 and appreciative for accomodation.     Verified time, location and to arrive 15 minutes early.     No further questions or concerns at this time.     Future Appointments   Date Time Provider Department Center   7/10/2025  5:30 PM [facility]

## 2025-07-08 NOTE — TELEPHONE ENCOUNTER
5/13 patient seen by Elizabeth Bustillos, discussed changing combipill to POP's d/t hx of Migraine with aura. Patient would like to switch.     To Elizabeth Bustillos, please advise. Thank you.

## 2025-07-08 NOTE — PROGRESS NOTES
Kristina eBe MD  Department of Urology  [address]  [place] [address]    T: [phone]  F: [phone]    To: Jenaro Jaquez MD   [address] [address]    Re: Lore Avila   MRN: [id number]  : 2005    Dear Jenaro Jaquez MD,    Today I had the pleasure of seeing Lore Avila in my clinic. As you know, Ms. Avila is a pleasant 19 year old year old female who I am seeing for follow-up. Patient was last seen in this department on 2025.    Briefly, patient underwent right ureteroscopy with me on 2025.  Her stent was left on a string.  Please note that her stone analysis returned calcium oxalate.    She did have a follow-up ultrasound that demonstrated no silent hydroureteronephrosis.  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  She is planning to potentially see vascular surgery for this       PAST MEDICAL HISTORY:  Past Medical History[1]     PAST SURGICAL HISTORY:  Past Surgical History[2]      ALLERGIES:  Allergies[3]      MEDICATIONS:  Current Outpatient Medications   Medication Instructions    albuterol 108 (90 Base) MCG/ACT Inhalation Aero Soln Inhale 1 puff and hold breath for 10 seconds then release.  Wait 1 full minute and repeat for second puff.  Use every 4-6 hours as needed.    Norethin Ace-Eth Estrad-FE (JUNEL FE 1/20) 1-20 MG-MCG Oral Tab 1 tablet, Oral, Daily        FAMILY HISTORY:  Family History[4]     SOCIAL HISTORY:  Short Social Hx on File[5]       PHYSICAL EXAMINATION:  There were no vitals filed for this visit.  CONSTITUTIONAL: No apparent distress, cooperative and communicative  NEUROLOGIC: Alert and oriented   HEAD: Normocephalic, atraumatic   EYES: Sclera non-icteric   ENT: Hearing intact, moist mucous membranes   NECK: No obvious goiter or masses   RESPIRATORY: Normal respiratory effort, Nonlabored breathing on room  air  SKIN: No evident rashes   ABDOMEN: no obvious masses, no obvious distension      REVIEW OF SYSTEMS:    A comprehensive 10-point review of systems was completed.  Pertinent positives and negatives are noted in the the HPI.       LABORATORY DATA:  ef Range & Units (hover)    Source-Stone Comment   Comment: Not provided   Color-Stone Brown   Size-Stone 3x2   Comment: Multiple pieces received.  Dimensions of the largest piece  reported.   Weight-Stone 23   CaOx Monohydrate 80   CaOx Dihydrate 20   Stone Comment2 Comment   Comment: Calculus received wet. Wet calculi must be dried before  analysis, which delays reporting of results. Leaving calculi  wet (such as water, saline, blood, urine) may lead to  changes in composition.     ef Range & Units (hover) 6/17/25 11:53 AM   Urine Color Yellow   Clarity Urine Turbid Abnormal    Spec Gravity 1.021   Glucose Urine Normal   Bilirubin Urine Negative   Ketones Urine Negative   Blood Urine 2+ Abnormal    pH Urine 7.5   Protein Urine Negative   Urobilinogen Urine Normal   Nitrite Urine Negative   Leukocyte Esterase Urine Negative   WBC Urine 1-5   RBC Urine 0-2   Bacteria Urine None Seen   Squamous Epi. Cells Few Abnormal    Renal Tubular Epithelial Cells None Seen   Transitional Cells None Seen   Yeast Urine None Seen           IMAGING REVIEW:    Exam: CT ABD PELVIS W CONTRAST   CPT Code(s): 66821,-183 - CT ABD  and  PELV W/CONTRAST,Omnipaque 300 100mL vial     CLINICAL HISTORY: Nausea and vomiting (R11.2), upper abdominal pain (R10.10), abnormal ultrasound of liver, bilateral lower abdominal cramping (R10.31, R10.32) and abnormal abdominal x-ray (R93.5).     TECHNIQUE: Postinfusion CT of the abdomen and pelvis with multiplanar reconstructions was performed utilizing intravenous Omnipaque 300. This study was performed on a Siemens Somatom Go CT scanner with both CARE Dose 4D tube modulation and SAFIRE   iterative reconstruction for radiation dose reduction.      COMPARISON: None.     FINDINGS:   The visualized lung bases are clear.     There is a 7 mm in diameter subtle hypodense focus in the right hepatic lobe (image 19, series 6). The liver is otherwise unremarkable. The gallbladder is partially decompressed but otherwise unremarkable.     The spleen, pancreas, adrenal glands and kidneys have normal CT appearance.     The urinary bladder is grossly unremarkable. The uterus and adnexa are grossly unremarkable.     Only a small amount of oral contrast is seen within the stomach. The lack of contrast throughout the majority of the bowel somewhat limits evaluation. The stomach and duodenum are grossly unremarkable. No distended loops of large or small bowel are   identified. The appendix has a normal appearance.     A trace amount of free fluid is seen within the pelvis, likely physiologic. No enlarged lymph nodes are identified. No free air is seen. No worrisome bone lesion is identified.     IMPRESSION:   There is a 7 mm hypodense lesion in the right hepatic lobe. This is nonspecific. Further characterization with pre and postcontrast MRI to include dynamic post infusion imaging of the liver is recommended.     This report was performed utilizing speech recognition software technology. Despite thorough proofreading, speech recognition errors could escape detection. If a word or phrase is confusing or out of context, please do not hesitate to call for   clarification.     Interpreting Radiologist:     Yobani Tomlinson M.D.   Electronically Signed: 06/30/2025 10:47 AM      OTHER RELEVANT DATA:   none     IMPRESSION: Status post right ureteroscopy with all stones treated.  Follow-up ultrasound and CT scan did not demonstrate any obvious residual stones (although it was a contrasted study)    Patient was counseled on stone prevention methods including hydration (until urine is clear), limiting salt and red meat/meat intake, eating a normal calcium diet, and drinking lemon with  water. We also talked about reasons to go to the emergency room - namely acute flank pain associated with fevers, chills, nausea or vomiting.    Offered 24-hour urine testing-she agreed     PLAN:  24h urine testing  RTC after 24h urine testing  Stone prevention  Patient to discuss nutcracker syndrome with her primary care provider who can provide her with a referral to a vascular surgeon    Thank you for referring this very pleasant patient to my clinic. If you have any questions or concerns, please do not hesitate to contact me.    Sincerely,  Kristina Bee MD    30 minutes were spent on this patient at this visit obtaining a history, reviewing medical records, developing a treatment plan, counseling and discussing treatment strategy with patient, coordination of care and documentation.     The 21st Century Cures Act makes medical notes available to patients in the interest of transparency.  However, please be advised that this is a medical document.  It is intended as a peer to peer communication.  It is written in medical language and may contain abbreviations or verbiage that are technical and unfamiliar.  It may appear blunt or direct.  Medical documents are intended to carry relevant information, facts as evident, and the clinical opinion of the practitioner.         [1]   Past Medical History:   Anxiety state    Asthma (HCC)    Calculus of kidney    Depression    Esophageal reflux    Extrinsic asthma, unspecified    History of UTI    Hx of motion sickness    Liver lesion    Migraines    Nephrolithiasis    PONV (postoperative nausea and vomiting)    Scoliosis    Shortness of breath   [2]   Past Surgical History:  Procedure Laterality Date    Egd N/A 06/20/2025    normal  Dr. Isaacs    Other surgical history  10/30/2021    Lt RPG, Lt URS, Lt stent insertion  - Dr. Hearn; repeat stent on right side 4/2025   [3]   Allergies  Allergen Reactions    Penicillin G Benzathine RASH     Denies skin peeling,  blistering or organ damage   [4]   Family History  Problem Relation Age of Onset    No Known Problems Father     Cancer Paternal Grandmother     Alcohol abuse Paternal Grandfather     Suicide History Maternal Cousin 19        crashed car into a tree    Depression Cousin     Other (heart and lung transplant) Paternal Uncle     Diabetes Neg     Hypertension Neg     Heart Disorder Neg    [5]   Social History  Socioeconomic History    Marital status: Single   Tobacco Use    Smoking status: Never     Passive exposure: Yes    Smokeless tobacco: Never   Vaping Use    Vaping status: Former   Substance and Sexual Activity    Alcohol use: Not Currently     Comment: rarely at family partie - takes a sip.    Drug use: Not Currently     Comment: not currently    Sexual activity: Yes     Partners: Male   Other Topics Concern    Caffeine Concern Yes     Comment: rarely soda    Second-hand smoke exposure No    Alcohol/drug concerns No    Violence concerns No     Social Drivers of Health     Food Insecurity: Food Insecurity Present (4/19/2025)    NCSS - Food Insecurity     Worried About Running Out of Food in the Last Year: No     Ran Out of Food in the Last Year: Yes   Transportation Needs: No Transportation Needs (4/19/2025)    NCSS - Transportation     Lack of Transportation: No   Housing Stability: Not At Risk (4/19/2025)    NCSS - Housing/Utilities     Has Housing: Yes     Worried About Losing Housing: No     Unable to Get Utilities: No

## 2025-07-08 NOTE — TELEPHONE ENCOUNTER
Can offer Thursday (this Thursday 7/10 or 7/17)    Thanks    Nick Isaacs MD  [facility] - Gastroenterology  7/8/2025  7:31 AM

## 2025-07-08 NOTE — TELEPHONE ENCOUNTER
Message to Elizabeth Bustillos. Please see 5/13/25 visit notes.   Pelvis ultrasound is scheduled on 7/24/25.  Annual is scheduled on 8/7/25.

## 2025-07-09 NOTE — PROGRESS NOTES
Of course   You are sweet.  I use Dr Samer Najjar but theres a few others as well   If you need other names let me know, I'm on [employer] as wel

## 2025-07-10 ENCOUNTER — OFFICE VISIT (OUTPATIENT)
Dept: GASTROENTEROLOGY | Facility: CLINIC | Age: 20
End: 2025-07-10

## 2025-07-10 VITALS
HEIGHT: 62 IN | WEIGHT: 108 LBS | SYSTOLIC BLOOD PRESSURE: 90 MMHG | DIASTOLIC BLOOD PRESSURE: 60 MMHG | BODY MASS INDEX: 19.88 KG/M2

## 2025-07-10 DIAGNOSIS — R10.9 ABDOMINAL CRAMPING: Primary | ICD-10-CM

## 2025-07-10 PROCEDURE — 99213 OFFICE O/P EST LOW 20 MIN: CPT | Performed by: INTERNAL MEDICINE

## 2025-07-10 PROCEDURE — 3074F SYST BP LT 130 MM HG: CPT | Performed by: INTERNAL MEDICINE

## 2025-07-10 PROCEDURE — 3008F BODY MASS INDEX DOCD: CPT | Performed by: INTERNAL MEDICINE

## 2025-07-10 PROCEDURE — 3078F DIAST BP <80 MM HG: CPT | Performed by: INTERNAL MEDICINE

## 2025-07-10 RX ORDER — DICYCLOMINE HYDROCHLORIDE 10 MG/1
10 CAPSULE ORAL 3 TIMES DAILY PRN
Qty: 60 CAPSULE | Refills: 0 | Status: SHIPPED | OUTPATIENT
Start: 2025-07-10

## 2025-07-10 NOTE — PROGRESS NOTES
[facility] - Gastroenterology                                                                                                  Clinic Progress Note    Chief Complaint   Patient presents with    Follow - Up       HPI:   Lore Avila is a 19 year old woman with history of BMI 20, severe recurrent major depression, generalized eating disorder, anxiety state, calculus of kidney, asthma here with a friend for follow-up.    She was seen in early to mid June 2025 regarding gastrointestinal symptoms new over the prior 1 month.  She described being diagnosed with a kidney stone 1 to 2 months earlier requiring stent placement and having gastrointestinal symptoms subsequently.  She described constant daily nausea with 1 episode of emesis.  She also describes trouble swallowing pills.  She believed that ondansetron had not helped.  Had also been in the emergency department and given metoclopramide which also did not help.  She was told she had GERD and tried pantoprazole which also did not help her symptoms she also denied heartburn or regurgitation.  She did stop this as it was not helping.  She also described upper abdominal pain in the central epigastric area as well as lower abdominal cramping.  She described bloating and cramping with eating.  She felt like she could only eat rice.  She denied unintentional weight loss.  She also described fatigue and sometimes feeling like she was tasting blood or iron.  She did describe some recent altered bowel habits as constipation requiring a bottle of magnesium to clear her out though felt like she was not evacuating much.  She did describe what she felt like she was evacuating was loose and watery.  She denied any blood in the stool.  Denied any specific family history of gastrointestinal issues.  She denied any new medications.  She did note multiple UTIs over the prior 2 to 3 years  requiring multiple antibiotic courses.    Says overall she has been feeling little bit better in regards to nausea and symptoms.  Says joey tea has helped with the nausea.  She is also changed her diet some.  Does still get some abdominal cramping particularly in the left lower side.  Says she is having plans for further evaluation of possible nutcracker syndrome.  Does note a lot of stress and anxiety but trying to manage this as best as possible.    History, Medications, Allergies, ROS:      Past Medical History[1]   Past Surgical History[2]   Family Hx: Family History[3]   Social History: Short Social Hx on File[4]     Medications (Active prior to today's visit):  Current Medications[5]    Allergies:  Allergies[6]    ROS:   Systems were reviewed and were negative except as noted in the HPI    PHYSICAL EXAM:   Blood pressure 90/60, height 5' 2\" (1.575 m), weight 108 lb (49 kg), last menstrual period 06/17/2025.    General:awake, cooperative, no acute distress  HEENT: EOMI, no scleral icterus, MMM; oral pharnyx is without exudates or lesions  Neck: no lymphadenopathy; thyroid is not enlarged and without nodules  CV: RRR  Resp: non-labored breathing  Abd: soft, non-tender, non-distended  Ext: no lower extremity swelling  Neuro: Alert, Oriented X 3  Skin: no rashes, bruises  Psych: normal affect    Labs/Imaging:     Reviewed as noted in the HPI and A/P    ASSESSMENT/PLAN:   Lore Avila is a 19 year old woman with history of BMI 20, severe recurrent major depression, generalized eating disorder, anxiety state, calculus of kidney, asthma here with a friend for follow-up.    At her visit 6/11/25, review of the chart notes an Xray of the abdomen from 5/29/25 for listed left abdominal pain with findings of large amount of stool. There was a CT A/P from 4/9/25 with bilateral non-obstructing renal calculi. There was an ultrasound of the gallbladder 5/29/25 showing a normal gallbladder and a 1.2 indeterminate liver  lesion. I reviewed the CMP and CBC from 5/29/25 which were unremarkable.    Further evaluation included an unremarkable TSH and celiac serologies as well as stool test for infection and a normal fecal calprotectin.     Unclear etiology for her recent symptoms.  Discussed recommended initial noninvasive evaluation below including MRI given the indeterminate liver lesion.  Consider upper endoscopy as well.  She would like to hold off on trying any empiric medications at this time.  Repeat CT scan of the abdomen pelvis from 6/30/2025 was also unrevealing for her symptoms.  MRI of the abdomen 7/3/2025 described a small area in the liver consistent with focal fat, small simple appearing kidney cysts, and description of perirenal vasculature possibly representing nutcracker syndrome.  An EGD 6/20/2025 was unremarkable including biopsies.    Review of the notes since indicates referral to vascular surgeon regarding the vascular issues noted    We reviewed the testing she has had which is overall been benign/unremarkable.  We discussed the uncertainty of the specifics if any of her symptoms.  We discussed possibilities of gastritis or irritable bowel syndrome and the difficulty with testing with this.  Little bit atypical for irritable bowel however given the cramping would be reasonable to try dicyclomine as needed which she would like to do.    Recommend:  -probiotic  -continue dietary changes  -dicyclomine prescribed    Orders This Visit:  No orders of the defined types were placed in this encounter.    Meds This Visit:  Requested Prescriptions      No prescriptions requested or ordered in this encounter     Imaging & Referrals:  None     MD [facility] - Gastroenterology  7/10/2025         [1]   Past Medical History:   Anxiety state    Asthma (HCC)    Calculus of kidney    Depression    Esophageal reflux    Extrinsic asthma, unspecified    History of UTI    Hx of  motion sickness    Liver lesion    Migraines    Nephrolithiasis    PONV (postoperative nausea and vomiting)    Scoliosis    Shortness of breath   [2]   Past Surgical History:  Procedure Laterality Date    Egd N/A 06/20/2025    normal  Dr. Isaacs    Other surgical history  10/30/2021    Lt RPG, Lt URS, Lt stent insertion  - Dr. Hearn; repeat stent on right side 4/2025   [3]   Family History  Problem Relation Age of Onset    No Known Problems Father     Cancer Paternal Grandmother     Alcohol abuse Paternal Grandfather     Suicide History Maternal Cousin 19        crashed car into a tree    Depression Cousin     Other (heart and lung transplant) Paternal Uncle     Diabetes Neg     Hypertension Neg     Heart Disorder Neg    [4]   Social History  Socioeconomic History    Marital status: Single   Tobacco Use    Smoking status: Never     Passive exposure: Yes    Smokeless tobacco: Never   Vaping Use    Vaping status: Former   Substance and Sexual Activity    Alcohol use: Not Currently     Comment: rarely at family partie - takes a sip.    Drug use: Not Currently     Comment: not currently    Sexual activity: Yes     Partners: Male   Other Topics Concern    Caffeine Concern Yes     Comment: rarely soda    Second-hand smoke exposure No    Alcohol/drug concerns No    Violence concerns No     Social Drivers of Health     Food Insecurity: Food Insecurity Present (4/19/2025)    NCSS - Food Insecurity     Worried About Running Out of Food in the Last Year: No     Ran Out of Food in the Last Year: Yes   Transportation Needs: No Transportation Needs (4/19/2025)    NCSS - Transportation     Lack of Transportation: No   Housing Stability: Not At Risk (4/19/2025)    NCSS - Housing/Utilities     Has Housing: Yes     Worried About Losing Housing: No     Unable to Get Utilities: No   [5]   Current Outpatient Medications   Medication Sig Dispense Refill    Drospirenone (SLYND) 4 MG Oral Tab Take 1 tablet by mouth in the morning. 28  tablet 2    Norethin Ace-Eth Estrad-FE (JUNEL FE 1/20) 1-20 MG-MCG Oral Tab Take 1 tablet by mouth daily. 28 tablet 12    albuterol 108 (90 Base) MCG/ACT Inhalation Aero Soln Inhale 1 puff and hold breath for 10 seconds then release.  Wait 1 full minute and repeat for second puff.  Use every 4-6 hours as needed. 18 g 3   [6]   Allergies  Allergen Reactions    Penicillin G Benzathine RASH     Denies skin peeling, blistering or organ damage

## 2025-07-24 ENCOUNTER — OFFICE VISIT (OUTPATIENT)
Facility: CLINIC | Age: 20
End: 2025-07-24

## 2025-07-24 ENCOUNTER — HOSPITAL ENCOUNTER (OUTPATIENT)
Dept: ULTRASOUND IMAGING | Facility: HOSPITAL | Age: 20
Discharge: HOME | End: 2025-07-24
Attending: NURSE PRACTITIONER
Payer: COMMERCIAL

## 2025-07-24 VITALS — HEIGHT: 62 IN | BODY MASS INDEX: 19.32 KG/M2 | WEIGHT: 105 LBS

## 2025-07-24 DIAGNOSIS — I87.1 NUTCRACKER PHENOMENON OF RENAL VEIN: Primary | ICD-10-CM

## 2025-07-24 DIAGNOSIS — R10.2 PELVIC PAIN: ICD-10-CM

## 2025-07-24 PROCEDURE — 76856 US EXAM PELVIC COMPLETE: CPT | Performed by: NURSE PRACTITIONER

## 2025-07-24 PROCEDURE — 76830 TRANSVAGINAL US NON-OB: CPT | Performed by: NURSE PRACTITIONER

## 2025-07-24 NOTE — PROGRESS NOTES
Samer F. Najjar, MD  Vascular Surgery  [facility]      VASCULAR SURGERY   CLINIC CONSULT NOTE        Name: Lore Avila   :   2005  [id number]     Chief Complaint   Patient presents with    Referral     The patient has a referral for abnormal MRI.  She has been diagnosed with the Nutcracker Syndrome.  She reports painful and light menses.  She has Left flank pain and Left abdominal side pain.          REFERRING PHYSICIAN:  Jenaro Jaquez  PRIMARY CARE PHYSICIAN:  Jenaro Jaquez MD    HISTORY OF PRESENT ILLNESS:   Patient is a 20 year old female who has been referred regarding assessment for a left renal vein nutcracker syndrome.  The patient describes having left flank pain and left abdominal pain that is almost constant.  She has always been very slender with a BMI of 20.  Per the chart she has a history of recurrent major depression, generalized eating disorder, anxiety state, calculus of kidney according to the chart.  She describes being fatigued as well as bloating and abdominal pain when eating and has been seen by Dr. Isaacs from gastroenterology.  She did undergo a CT scan that revealed compression of the left renal vein with enlargement of the left ovarian vein with pelvic varices.  This was followed by an MRI that confirmed the same.  The MRI was obtained because of a liver lesion that ultimately was an adipose tissue.  She has had multiple urine tests 2 of them did reveal blood in the urine but she tells me those were during the time where she had menses.  The time that she did not have her menses the sample was negative for hematuria.  She also underwent a pelvic ultrasound that has not been read yet.  She denies any significant pain during intercourse.    PAST MEDICAL HISTORY:    Past Medical History[1]    PAST SURGICAL HISTORY:   Past Surgical History[2]     MEDICATIONS:   Medications - Current[3]    ALLERGIES:    She is allergic to penicillin g benzathine.    SOCIAL  HISTORY:    Patient  reports that she has never smoked. She has been exposed to tobacco smoke. She has never used smokeless tobacco. She reports that she does not currently use alcohol. She reports that she does not currently use drugs.    FAMILY HISTORY:    Patient's family history includes Alcohol abuse in her paternal grandfather; Cancer in her paternal grandmother; Depression in her cousin; No Known Problems in her father; Suicide History (age of onset: 19) in her maternal cousin; heart and lung transplant in her paternal uncle.    ROS:     A 12 point review of systems with pertinent positives and negatives listed in the HPI.    EXAM:    Ht 5' 2\" (1.575 m)   Wt 105 lb (47.6 kg)   LMP 07/15/2025 (Approximate)   BMI 19.20 kg/m²   GENERAL: alert and orientated X 3, well developed, well nourished, in no apparent distress  PSYCH: normal mood and affect  HEENT: ears and throat are clear  NECK: supple, no lymphadenopathy, thyroid wnl  CAROTID: no bruits  RESPIRATORY: no rales, rhonchi, or wheezes B  CARDIO: RRR without murmur, no murmur, no gallop   ABDOMEN: soft, non-tender with no palpable aneurysm or masses  BACK: normal, no tenderness  SKIN: no rashes, warm and dry  EXTREMITIES: no tenderness  NEURO: no sensory or motor deficits  VASCULAR:      Femoral Popliteal DP PT Peroneal   Right 2+       palpable, weak palpable, weak    Left 2+       palpable, weak palpable, weak        LABS:   Lab Results   Component Value Date     10/07/2022    A1C 5.3 10/07/2022      Lab Results   Component Value Date    GLU 87 06/17/2025    BUN 9 06/17/2025    CREATSERUM 0.90 06/17/2025    BUNCREA 10.0 06/17/2025    ANIONGAP 9 06/17/2025    GFRAA 101 10/28/2021    GFRNAA 101 10/28/2021    CA 10.2 06/17/2025     06/17/2025    K 4.3 06/17/2025     06/17/2025    CO2 26.0 06/17/2025    OSMOCALC 286 06/17/2025      Lab Results   Component Value Date    WBC 6.5 06/17/2025    RBC 4.51 06/17/2025    HGB 13.9 06/17/2025     HCT 41.1 06/17/2025    MCV 91.1 06/17/2025    MCH 30.8 06/17/2025    MCHC 33.8 06/17/2025    RDW 13.1 06/17/2025    .0 06/17/2025    MPV 8.2 03/21/2018        Lab Results   Component Value Date    HGB 13.9 06/17/2025    HGB 13.0 05/29/2025    HGB 14.2 04/22/2025    HGB 13.3 10/05/2024    HGB 13.6 03/20/2024    HGB 11.6 12/12/2022    CREATSERUM 0.90 06/17/2025    CREATSERUM 1.00 05/29/2025    CREATSERUM 0.85 04/22/2025    CREATSERUM 0.88 10/05/2024    CREATSERUM 0.85 03/20/2024    CREATSERUM 0.79 12/12/2022         ASSESSMENT/PLAN:    I have reviewed the patient's prior documentation and studies from Epic and from [employer].    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  This can explain the majority of her symptoms.  Typically we would recommend treatment with transposition of the left renal vein.  While the vein appears to be impinged below the superior mesenteric artery but there does appear to be a reasonable lumen based on her CT scan above.  I would like to confirm the degree of stenosis with an abdominal ultrasound where we can measure the velocities in her renal vein.  I would like to conversed with her gastroenterologist to see if any of her symptoms can perhaps be improved.  By weight gain we will discuss the possibility of treatment of her left renal vein transposition after that ultrasound.        The patient indicated an understanding of these issues and agreed to the plan and all questions were answered during the clinic visit.      Thank you for allowing me to participate in your patient's care.   Please do not hesitate to contact me with any questions.    Sincerely,  Samer F. Najjar, MD    Please note: Dragon speech recognition software was used to prepare this  note. If a word or phrase is confusing, it is likely do to a failure of recognition.   Please contact me with any questions or clarifications.         [1]   Past Medical History:   Anxiety state    Asthma (HCC)    Calculus of kidney    Depression    Esophageal reflux    Extrinsic asthma, unspecified    History of UTI    Hx of motion sickness    Liver lesion    Migraines    Nephrolithiasis    PONV (postoperative nausea and vomiting)    Scoliosis    Shortness of breath   [2]   Past Surgical History:  Procedure Laterality Date    Egd N/A 06/20/2025    normal  Dr. Isaacs    Other surgical history  10/30/2021    Lt RPG, Lt URS, Lt stent insertion  - Dr. Hearn; repeat stent on right side 4/2025   [3]   Current Outpatient Medications:     dicyclomine 10 MG Oral Cap, Take 1 capsule (10 mg total) by mouth 3 (three) times daily as needed., Disp: 60 capsule, Rfl: 0    Drospirenone (SLYND) 4 MG Oral Tab, Take 1 tablet by mouth in the morning., Disp: 28 tablet, Rfl: 2    Norethin Ace-Eth Estrad-FE (JUNEL FE 1/20) 1-20 MG-MCG Oral Tab, Take 1 tablet by mouth daily. (Patient not taking: Reported on 7/10/2025), Disp: 28 tablet, Rfl: 12    albuterol 108 (90 Base) MCG/ACT Inhalation Aero Soln, Inhale 1 puff and hold breath for 10 seconds then release.  Wait 1 full minute and repeat for second puff.  Use every 4-6 hours as needed., Disp: 18 g, Rfl: 3

## 2025-08-08 ENCOUNTER — TELEPHONE (OUTPATIENT)
Dept: SURGERY | Facility: CLINIC | Age: 20
End: 2025-08-08

## 2025-08-12 ENCOUNTER — PATIENT MESSAGE (OUTPATIENT)
Dept: OBGYN CLINIC | Facility: CLINIC | Age: 20
End: 2025-08-12

## 2025-08-13 RX ORDER — ACETAMINOPHEN AND CODEINE PHOSPHATE 120; 12 MG/5ML; MG/5ML
0.35 SOLUTION ORAL DAILY
Qty: 28 TABLET | Refills: 2 | Status: SHIPPED | OUTPATIENT
Start: 2025-08-13 | End: 2026-08-13

## 2025-08-19 ENCOUNTER — HOSPITAL ENCOUNTER (EMERGENCY)
Facility: HOSPITAL | Age: 20
Discharge: HOME | End: 2025-08-19
Attending: EMERGENCY MEDICINE

## 2025-08-19 ENCOUNTER — NURSE TRIAGE (OUTPATIENT)
Dept: FAMILY MEDICINE CLINIC | Facility: CLINIC | Age: 20
End: 2025-08-19

## 2025-08-19 ENCOUNTER — APPOINTMENT (OUTPATIENT)
Dept: CT IMAGING | Facility: HOSPITAL | Age: 20
End: 2025-08-19
Attending: EMERGENCY MEDICINE

## 2025-08-19 ENCOUNTER — APPOINTMENT (OUTPATIENT)
Dept: GENERAL RADIOLOGY | Facility: HOSPITAL | Age: 20
End: 2025-08-19
Attending: EMERGENCY MEDICINE

## 2025-08-19 VITALS
DIASTOLIC BLOOD PRESSURE: 64 MMHG | HEIGHT: 62 IN | BODY MASS INDEX: 19.51 KG/M2 | OXYGEN SATURATION: 100 % | RESPIRATION RATE: 15 BRPM | TEMPERATURE: 99 F | WEIGHT: 106 LBS | HEART RATE: 86 BPM | SYSTOLIC BLOOD PRESSURE: 108 MMHG

## 2025-08-19 DIAGNOSIS — R06.00 DYSPNEA, UNSPECIFIED TYPE: ICD-10-CM

## 2025-08-19 DIAGNOSIS — R07.9 CHEST PAIN, UNSPECIFIED TYPE: Primary | ICD-10-CM

## 2025-08-19 LAB
ALBUMIN SERPL-MCNC: 5.6 G/DL (ref 3.2–4.8)
ALBUMIN/GLOB SERPL: 2.4 (ref 1–2)
ALP LIVER SERPL-CCNC: 66 U/L (ref 52–144)
ALT SERPL-CCNC: 22 U/L (ref 10–49)
ANION GAP SERPL CALC-SCNC: 10 MMOL/L (ref 0–18)
AST SERPL-CCNC: 34 U/L (ref ?–34)
ATRIAL RATE: 103 BPM
B-HCG UR QL: NEGATIVE
BASOPHILS # BLD AUTO: 0.02 X10(3) UL (ref 0–0.2)
BASOPHILS NFR BLD AUTO: 0.2 %
BILIRUB SERPL-MCNC: 2 MG/DL (ref 0.3–1.2)
BILIRUB UR QL: NEGATIVE
BUN BLD-MCNC: 6 MG/DL (ref 9–23)
BUN/CREAT SERPL: 6.5 (ref 10–20)
CALCIUM BLD-MCNC: 10 MG/DL (ref 8.7–10.4)
CHLORIDE SERPL-SCNC: 100 MMOL/L (ref 98–112)
CLARITY UR: CLEAR
CO2 SERPL-SCNC: 27 MMOL/L (ref 21–32)
COLOR UR: COLORLESS
CREAT BLD-MCNC: 0.93 MG/DL (ref 0.55–1.02)
D DIMER PPP FEU-MCNC: 0.66 UG/ML FEU (ref ?–0.5)
DEPRECATED RDW RBC AUTO: 41.2 FL (ref 35.1–46.3)
EGFRCR SERPLBLD CKD-EPI 2021: 90 ML/MIN/1.73M2 (ref 60–?)
EOSINOPHIL # BLD AUTO: 0.03 X10(3) UL (ref 0–0.7)
EOSINOPHIL NFR BLD AUTO: 0.3 %
ERYTHROCYTE [DISTWIDTH] IN BLOOD BY AUTOMATED COUNT: 12.5 % (ref 11–15)
FLUAV + FLUBV RNA SPEC NAA+PROBE: NEGATIVE
FLUAV + FLUBV RNA SPEC NAA+PROBE: NEGATIVE
GLOBULIN PLAS-MCNC: 2.3 G/DL (ref 2–3.5)
GLUCOSE BLD-MCNC: 87 MG/DL (ref 70–99)
GLUCOSE UR-MCNC: NORMAL MG/DL
HCT VFR BLD AUTO: 37.9 % (ref 35–48)
HGB BLD-MCNC: 12.8 G/DL (ref 12–16)
HGB UR QL STRIP.AUTO: NEGATIVE
IMM GRANULOCYTES # BLD AUTO: 0.07 X10(3) UL (ref 0–1)
IMM GRANULOCYTES NFR BLD: 0.7 %
KETONES UR-MCNC: NEGATIVE MG/DL
LEUKOCYTE ESTERASE UR QL STRIP.AUTO: NEGATIVE
LIPASE SERPL-CCNC: 30 U/L (ref 12–53)
LYMPHOCYTES # BLD AUTO: 2.55 X10(3) UL (ref 1–4)
LYMPHOCYTES NFR BLD AUTO: 25.4 %
MCH RBC QN AUTO: 30.8 PG (ref 26–34)
MCHC RBC AUTO-ENTMCNC: 33.8 G/DL (ref 31–37)
MCV RBC AUTO: 91.1 FL (ref 80–100)
MONOCYTES # BLD AUTO: 0.85 X10(3) UL (ref 0.1–1)
MONOCYTES NFR BLD AUTO: 8.5 %
NEUTROPHILS # BLD AUTO: 6.51 X10 (3) UL (ref 1.5–7.7)
NEUTROPHILS # BLD AUTO: 6.51 X10(3) UL (ref 1.5–7.7)
NEUTROPHILS NFR BLD AUTO: 64.9 %
NITRITE UR QL STRIP.AUTO: NEGATIVE
OSMOLALITY SERPL CALC.SUM OF ELEC: 281 MOSM/KG (ref 275–295)
P AXIS: 29 DEGREES
P-R INTERVAL: 122 MS
PH UR: 6.5 (ref 5–8)
PLATELET # BLD AUTO: 192 10(3)UL (ref 150–450)
POTASSIUM SERPL-SCNC: 3.8 MMOL/L (ref 3.5–5.1)
PROT SERPL-MCNC: 7.9 G/DL (ref 5.7–8.2)
PROT UR-MCNC: NEGATIVE MG/DL
Q-T INTERVAL: 328 MS
QRS DURATION: 84 MS
QTC CALCULATION (BEZET): 429 MS
R AXIS: 74 DEGREES
RBC # BLD AUTO: 4.16 X10(6)UL (ref 3.8–5.3)
RSV RNA SPEC NAA+PROBE: NEGATIVE
SARS-COV-2 RNA RESP QL NAA+PROBE: NOT DETECTED
SODIUM SERPL-SCNC: 137 MMOL/L (ref 136–145)
SP GR UR STRIP: <1.005 (ref 1–1.03)
T AXIS: -30 DEGREES
TROPONIN I SERPL HS-MCNC: <3 NG/L (ref ?–34)
UROBILINOGEN UR STRIP-ACNC: NORMAL
VENTRICULAR RATE: 103 BPM
WBC # BLD AUTO: 10 X10(3) UL (ref 4–11)

## 2025-08-19 PROCEDURE — 85379 FIBRIN DEGRADATION QUANT: CPT | Performed by: EMERGENCY MEDICINE

## 2025-08-19 PROCEDURE — 96360 HYDRATION IV INFUSION INIT: CPT

## 2025-08-19 PROCEDURE — 93010 ELECTROCARDIOGRAM REPORT: CPT

## 2025-08-19 PROCEDURE — 71045 X-RAY EXAM CHEST 1 VIEW: CPT | Performed by: EMERGENCY MEDICINE

## 2025-08-19 PROCEDURE — 81025 URINE PREGNANCY TEST: CPT

## 2025-08-19 PROCEDURE — 81003 URINALYSIS AUTO W/O SCOPE: CPT | Performed by: EMERGENCY MEDICINE

## 2025-08-19 PROCEDURE — 87637 SARSCOV2&INF A&B&RSV AMP PRB: CPT | Performed by: EMERGENCY MEDICINE

## 2025-08-19 PROCEDURE — 85025 COMPLETE CBC W/AUTO DIFF WBC: CPT | Performed by: EMERGENCY MEDICINE

## 2025-08-19 PROCEDURE — 99285 EMERGENCY DEPT VISIT HI MDM: CPT

## 2025-08-19 PROCEDURE — 93005 ELECTROCARDIOGRAM TRACING: CPT

## 2025-08-19 PROCEDURE — 96361 HYDRATE IV INFUSION ADD-ON: CPT

## 2025-08-19 PROCEDURE — 84484 ASSAY OF TROPONIN QUANT: CPT | Performed by: EMERGENCY MEDICINE

## 2025-08-19 PROCEDURE — 80053 COMPREHEN METABOLIC PANEL: CPT | Performed by: EMERGENCY MEDICINE

## 2025-08-19 PROCEDURE — 83690 ASSAY OF LIPASE: CPT | Performed by: EMERGENCY MEDICINE

## 2025-08-19 PROCEDURE — 71260 CT THORAX DX C+: CPT | Performed by: EMERGENCY MEDICINE

## 2025-08-28 ENCOUNTER — OFFICE VISIT (OUTPATIENT)
Facility: CLINIC | Age: 20
End: 2025-08-28

## 2025-08-28 ENCOUNTER — NURSE ONLY (OUTPATIENT)
Facility: CLINIC | Age: 20
End: 2025-08-28

## 2025-08-28 ENCOUNTER — HOSPITAL ENCOUNTER (EMERGENCY)
Facility: HOSPITAL | Age: 20
Discharge: HOME | End: 2025-08-28
Attending: EMERGENCY MEDICINE

## 2025-08-28 ENCOUNTER — APPOINTMENT (OUTPATIENT)
Dept: CT IMAGING | Facility: HOSPITAL | Age: 20
End: 2025-08-28
Attending: EMERGENCY MEDICINE

## 2025-08-28 VITALS
BODY MASS INDEX: 19.32 KG/M2 | DIASTOLIC BLOOD PRESSURE: 75 MMHG | OXYGEN SATURATION: 98 % | WEIGHT: 105 LBS | HEART RATE: 70 BPM | TEMPERATURE: 98 F | HEIGHT: 62 IN | RESPIRATION RATE: 14 BRPM | SYSTOLIC BLOOD PRESSURE: 136 MMHG

## 2025-08-28 DIAGNOSIS — I87.1 NUTCRACKER PHENOMENON OF RENAL VEIN: Primary | ICD-10-CM

## 2025-08-28 DIAGNOSIS — R10.9 FLANK PAIN: Primary | ICD-10-CM

## 2025-08-28 DIAGNOSIS — I87.1 NUTCRACKER PHENOMENON OF RENAL VEIN: ICD-10-CM

## 2025-08-28 LAB
ANION GAP SERPL CALC-SCNC: 9 MMOL/L (ref 0–18)
APTT PPP: 27.9 SECONDS (ref 23–36)
B-HCG UR QL: NEGATIVE
BASOPHILS # BLD AUTO: 0.02 X10(3) UL (ref 0–0.2)
BASOPHILS NFR BLD AUTO: 0.2 %
BUN BLD-MCNC: 7 MG/DL (ref 9–23)
BUN/CREAT SERPL: 7.4 (ref 10–20)
CALCIUM BLD-MCNC: 10.1 MG/DL (ref 8.7–10.4)
CHLORIDE SERPL-SCNC: 102 MMOL/L (ref 98–112)
CO2 SERPL-SCNC: 28 MMOL/L (ref 21–32)
CREAT BLD-MCNC: 0.95 MG/DL (ref 0.55–1.02)
DEPRECATED RDW RBC AUTO: 42.4 FL (ref 35.1–46.3)
EGFRCR SERPLBLD CKD-EPI 2021: 87 ML/MIN/1.73M2 (ref 60–?)
EOSINOPHIL # BLD AUTO: 0.07 X10(3) UL (ref 0–0.7)
EOSINOPHIL NFR BLD AUTO: 0.7 %
ERYTHROCYTE [DISTWIDTH] IN BLOOD BY AUTOMATED COUNT: 12.9 % (ref 11–15)
GLUCOSE BLD-MCNC: 76 MG/DL (ref 70–99)
HCT VFR BLD AUTO: 41.8 % (ref 35–48)
HGB BLD-MCNC: 14.4 G/DL (ref 12–16)
IMM GRANULOCYTES # BLD AUTO: 0.01 X10(3) UL (ref 0–1)
IMM GRANULOCYTES NFR BLD: 0.1 %
INR BLD: 1 (ref 0.8–1.2)
LYMPHOCYTES # BLD AUTO: 4.52 X10(3) UL (ref 1–4)
LYMPHOCYTES NFR BLD AUTO: 46.4 %
MCH RBC QN AUTO: 31 PG (ref 26–34)
MCHC RBC AUTO-ENTMCNC: 34.4 G/DL (ref 31–37)
MCV RBC AUTO: 90.1 FL (ref 80–100)
MONOCYTES # BLD AUTO: 0.51 X10(3) UL (ref 0.1–1)
MONOCYTES NFR BLD AUTO: 5.2 %
NEUTROPHILS # BLD AUTO: 4.62 X10 (3) UL (ref 1.5–7.7)
NEUTROPHILS # BLD AUTO: 4.62 X10(3) UL (ref 1.5–7.7)
NEUTROPHILS NFR BLD AUTO: 47.4 %
OSMOLALITY SERPL CALC.SUM OF ELEC: 285 MOSM/KG (ref 275–295)
PLATELET # BLD AUTO: 313 10(3)UL (ref 150–450)
POTASSIUM SERPL-SCNC: 3.7 MMOL/L (ref 3.5–5.1)
PROTHROMBIN TIME: 13.6 SECONDS (ref 11.6–14.8)
RBC # BLD AUTO: 4.64 X10(6)UL (ref 3.8–5.3)
SODIUM SERPL-SCNC: 139 MMOL/L (ref 136–145)
WBC # BLD AUTO: 9.8 X10(3) UL (ref 4–11)

## 2025-08-28 PROCEDURE — 36415 COLL VENOUS BLD VENIPUNCTURE: CPT

## 2025-08-28 PROCEDURE — 74177 CT ABD & PELVIS W/CONTRAST: CPT | Performed by: EMERGENCY MEDICINE

## 2025-08-28 PROCEDURE — 99214 OFFICE O/P EST MOD 30 MIN: CPT | Performed by: SURGERY

## 2025-08-28 PROCEDURE — 99284 EMERGENCY DEPT VISIT MOD MDM: CPT

## 2025-08-28 PROCEDURE — 80048 BASIC METABOLIC PNL TOTAL CA: CPT | Performed by: EMERGENCY MEDICINE

## 2025-08-28 PROCEDURE — 85730 THROMBOPLASTIN TIME PARTIAL: CPT | Performed by: EMERGENCY MEDICINE

## 2025-08-28 PROCEDURE — 85025 COMPLETE CBC W/AUTO DIFF WBC: CPT | Performed by: EMERGENCY MEDICINE

## 2025-08-28 PROCEDURE — 99285 EMERGENCY DEPT VISIT HI MDM: CPT

## 2025-08-28 PROCEDURE — 85610 PROTHROMBIN TIME: CPT | Performed by: EMERGENCY MEDICINE

## 2025-08-28 PROCEDURE — 81025 URINE PREGNANCY TEST: CPT

## 2025-08-29 ENCOUNTER — OFFICE VISIT (OUTPATIENT)
Dept: OBGYN CLINIC | Facility: CLINIC | Age: 20
End: 2025-08-29

## 2025-08-29 VITALS
HEART RATE: 94 BPM | SYSTOLIC BLOOD PRESSURE: 98 MMHG | BODY MASS INDEX: 19 KG/M2 | WEIGHT: 105.81 LBS | DIASTOLIC BLOOD PRESSURE: 64 MMHG

## 2025-08-29 DIAGNOSIS — Z01.419 WELL WOMAN EXAM WITH ROUTINE GYNECOLOGICAL EXAM: Primary | ICD-10-CM

## 2025-08-29 DIAGNOSIS — Z12.4 SCREENING FOR CERVICAL CANCER: ICD-10-CM

## 2025-08-29 DIAGNOSIS — Z30.41 ORAL CONTRACEPTIVE PILL SURVEILLANCE: ICD-10-CM

## 2025-08-29 DIAGNOSIS — N63.11 LUMP IN UPPER OUTER QUADRANT OF RIGHT BREAST: ICD-10-CM

## 2025-08-29 RX ORDER — ACETAMINOPHEN AND CODEINE PHOSPHATE 120; 12 MG/5ML; MG/5ML
0.35 SOLUTION ORAL DAILY
Qty: 84 TABLET | Refills: 4 | Status: SHIPPED | OUTPATIENT
Start: 2025-08-29

## (undated) DEVICE — SNAP KOVER: Brand: UNBRANDED

## (undated) DEVICE — JELLY,LUBE,STERILE,FLIP TOP,TUBE,2-OZ: Brand: MEDLINE

## (undated) DEVICE — Device

## (undated) DEVICE — KIT CLEAN ENDOKIT 1.1OZ GOWNX2

## (undated) DEVICE — YANKAUER,BULB TIP,W/O VENT,RIGID,STERILE: Brand: MEDLINE

## (undated) DEVICE — SOLUTION IRRIG 3000ML 0.9% NACL FLX CONT

## (undated) DEVICE — NITINOL WIRE WITH HYDROPHILIC TIP: Brand: SENSOR

## (undated) DEVICE — CONMED SCOPE SAVER BITE BLOCK, 20X27 MM: Brand: SCOPE SAVER

## (undated) DEVICE — SOLUTION IRRIG 1000ML ST H2O AQUALITE PLAS

## (undated) DEVICE — FORCEPS BX LG 2.4MM X 240CM NDL RAD JAW 4

## (undated) DEVICE — PACK CUSTOM CYSTO

## (undated) DEVICE — 60 ML SYRINGE REGULAR TIP: Brand: MONOJECT

## (undated) DEVICE — FIBER LSR 200UM 2J 80HZ 60W DL FOR LITHO

## (undated) DEVICE — V2 SPECIMEN COLLECTION MANIFOLD KIT: Brand: NEPTUNE

## (undated) DEVICE — GAMMEX® PI HYBRID SIZE 8, STERILE POWDER-FREE SURGICAL GLOVE, POLYISOPRENE AND NEOPRENE BLEND: Brand: GAMMEX

## (undated) DEVICE — HYDROGEL COATED URETERAL DILATOR: Brand: NOTTINGHAM ONE-STEP

## (undated) DEVICE — CATHETER URET 5FR L70CM FLX OPN TIP NONPORTED

## (undated) DEVICE — GAMMEX® PI HYBRID SIZE 6, STERILE POWDER-FREE SURGICAL GLOVE, POLYISOPRENE AND NEOPRENE BLEND: Brand: GAMMEX

## (undated) DEVICE — MEDI-VAC NON-CONDUCTIVE SUCTION TUBING: Brand: CARDINAL HEALTH

## (undated) DEVICE — NITINOL STONE RETRIEVAL BASKET: Brand: ZERO TIP

## (undated) DEVICE — KIT ENDO ORCAPOD 160/180/190

## (undated) DEVICE — SINGLE-USE DIGITAL FLEXIBLE URETEROSCOPE: Brand: LITHOVUE™ ELITE

## (undated) DEVICE — ENDOSCOPIC VALVE WITH ADAPTER.: Brand: SURSEAL® II

## (undated) DEVICE — SLEEVE COMPR MD KNEE LEN SGL USE KENDALL SCD

## (undated) DEVICE — UROLOGY DRAIN BAG

## (undated) DEVICE — SOLUTION IRRIG 1000ML 0.9% NACL USP BTL

## (undated) DEVICE — MEDI-VAC NON-CONDUCTIVE SUCTION TUBING 6MM X 1.8M (6FT.) L: Brand: CARDINAL HEALTH

## (undated) DEVICE — DUAL LUMEN URETERAL CATHETER

## (undated) DEVICE — URETERAL ACCESS SHEATH SET: Brand: NAVIGATOR HD

## (undated) NOTE — MR AVS SNAPSHOT
[id number] [facility] [address]  [place] [id number] [phone]               Thank you for choosing us for your health care visit with Stefan Yost PA-C.   We are glad to serve you and happy to provide you with this summary of your swallowing, aching all over with headache, and fever. Both types of infections are contagious. They may be spread by coughing, kissing, or touching others after touching your mouth or nose.   A test has been done to find out whether you (or your child, if y unless another medicine was prescribed for this. Talk with your healthcare provider before taking these medicines if you have chronic liver or kidney disease or ever had a stomach ulcer or GI bleeding.   · Use throat lozenges or numbing throat sprays to hel Penicillin G Benzathine Rash                Today's Vital Signs     Pulse Temp Weight             95 98.2 °F (36.8 °C) (Oral) 69 lb 6.4 oz (2 %*, Z = -2.14)       *Growth percentiles are based on CDC 2-20 Years data         Current Medications          Th

## (undated) NOTE — LETTER
2/6/2018          To Whom It May Concern:    Camille Kussmaul is currently under my medical care and may not return to school at this time. Please excuse Lore for 3 days. She may return to school on Thursday, 2/8/18.   Activity is restricted as follows: none

## (undated) NOTE — LETTER
[facility] [employer] [facility] Child Health Examination       Student's Name  Michell Astorga Birth Date (If adding dates to the above immunization history section, put your initials by date(s) and sign here.)   ALTERNATIVE PROOF OF IMMUNITY   1.Clinical diagnosis (measles, mumps, hepatits B) is allowed when verified by physician & supported with lab confirma Child wakes during the night coughing   Yes   No    Yes   No    Loss of function of one of paired organs? (eye/ear/kidney/testicle)   Yes   No      Birth Defects? Developmental delay? Yes   No    Yes   No  Hospitalizations? When? What for?    Yes   No Resistance (hypertension, dyslipidemia, polycystic ovarian syndrome, acanthosis nigricans)    No           At Risk  No   Lead Risk Questionnaire  Req'd for children 6 months thru 6 yrs enrolled in licensed or public school operated day care, ,  nu NEEDS/MODIFICATIONS required in the school setting  None DIETARY Needs/Restrictions     None   SPECIAL INSTRUCTIONS/DEVICES e.g. safety glasses, glass eye, chest protector for arrhythmia, pacemaker, prosthetic device, dental bridge, false teeth, athleticsu

## (undated) NOTE — Clinical Note
3/7/2017          To Whom It May Concern:    Giovani Floyd is currently under my medical care and may not return to school at this time. Please excuse Lore for 3 days. She may return to school on Thursday, 3/9/17.   Activity is restricted as follows: none

## (undated) NOTE — LETTER
[facility]  Administration of Anesthesia  I, Lore Avila agree to be cared for by a physician anesthesiologist alone and/or with a nurse anesthetist, who is specially trained to monitor me and give me medicine to put me to sleep or keep me comfortable during my procedure    I understand that my anesthesiologist and/or anesthetist is not an employee or agent of [facility] or [employer]. He or she works for [facility]    As the patient asking for anesthesia services, I agree to:  Allow the anesthesiologist (anesthesia doctor) to give me medicine and do additional procedures as necessary. Some examples are: Starting or using an “IV” to give me medicine, fluids or blood during my procedure, and having a breathing tube placed to help me breathe when I’m asleep (intubation). In the event that my heart stops working properly, I understand that my anesthesiologist will make every effort to sustain my life, unless otherwise directed by [facility] Do Not Resuscitate documents.  Tell my anesthesia doctor before my procedure:  If I am pregnant.  The last time that I ate or drank.  iii. All of the medicines I take (including prescriptions, herbal supplements, and pills I can buy without a prescription (including street drugs/illegal medications). Failure to inform my anesthesiologist about these medicines may increase my risk of anesthetic complications.  iv.If I am allergic to anything or have had a reaction to anesthesia before.  I understand how the anesthesia medicine will help me (benefits).  I understand that with any type of anesthesia medicine there are risks:  The most common risks are: nausea, vomiting, sore throat, muscle soreness, damage to my eyes, mouth, or teeth (from breathing tube placement).  Rare risks include: remembering what happened during my procedure, allergic reactions to medications, injury to my airway, heart, lungs, vision, nerves, or  muscles and in extremely rare instances death.  My doctor has explained to me other choices available to me for my care (alternatives).  Pregnant Patients (“epidural”):  I understand that the risks of having an epidural (medicine given into my back to help control pain during labor), include itching, low blood pressure, difficulty urinating, headache or slowing of the baby’s heart. Very rare risks include infection, bleeding, seizure, irregular heart rhythms and nerve injury.  Regional Anesthesia (“spinal”, “epidural”, & “nerve blocks”):  I understand that rare but potential complications include headache, bleeding, infection, seizure, irregular heart rhythms, and nerve injury.    _____________________________________________________________________________  Patient (or Representative) Signature/Relationship to Patient  Date   Time    _____________________________________________________________________________   Name (if used)    Language/Organization   Time    _____________________________________________________________________________  Nurse Anesthetist Signature     Date   Time  _____________________________________________________________________________  Anesthesiologist Signature     Date   Time  I have discussed the procedure and information above with the patient (or patient’s representative) and answered their questions. The patient or their representative has agreed to have anesthesia services.    _____________________________________________________________________________  Witness        Date   Time  I have verified that the signature is that of the patient or patient’s representative, and that it was signed before the procedure  Patient Name: Lore Avila     : 2005                 Printed: 2025 at 11:46 AM    Medical Record #: [id number]                                            Page 1 of 1  ----------ANESTHESIA CONSENT----------

## (undated) NOTE — LETTER
Date: 2/5/2024    Patient Name: Lore Avila          To Whom it may concern:    This letter has been written at the patient's request. The above patient was seen at the [facility] for treatment of a medical condition.    This patient should be excused from attending work/school for 2/5/2024.    The patient may return to work/school on 2/6/2024 with the following limitations: fever free for 24 hours with out fever reducing medications.      Sincerely,    PATTIE Weiner

## (undated) NOTE — LETTER
Date & Time: 11/9/2023, 11:41 AM  Patient: Ulices Weir  Encounter Provider(s):    Danelle Red PA-C       To Whom It May Concern:    Sumeet Tang was seen and treated in our department on 11/9/2023. She can return to work 11/13/2023.     If you have any questions or concerns, please do not hesitate to call.        _____________________________  Physician/APC Signature

## (undated) NOTE — LETTER
[facility]  [address], [place], [place]    Authorization for Surgical Operation and Procedure                               I hereby authorize Nick Isaacs MD, my physician and his/her assistants (if applicable), which may include medical students, residents, and/or fellows, to perform the following surgical operation/ procedure and administer such anesthesia as may be determined necessary by my physician: Operation/Procedure name (s) ESOPHAGOGASTRODUODENOSCOPY on Lore Avila   2.   I recognize that during the surgical operation/procedure, unforeseen conditions may necessitate additional or different procedures than those listed above.  I, therefore, further authorize and request that the above-named surgeon, assistants, or designees perform such procedures as are, in their judgment, necessary and desirable.    3.   My surgeon/physician has discussed prior to my surgery the potential benefits, risks and side effects of this procedure; the likelihood of achieving goals; and potential problems that might occur during recuperation.  They also discussed reasonable alternatives to the procedure, including risks, benefits, and side effects related to the alternatives and risks related to not receiving this procedure.  I have had all my questions answered and I acknowledge that no guarantee has been made as to the result that may be obtained.    4.   Should the need arise during my operation/procedure, which includes change of level of care prior to discharge, I also consent to the administration of blood and/or blood products.  Further, I understand that despite careful testing and screening of blood or blood products by collecting agencies, I may still be subject to ill effects as a result of receiving a blood transfusion and/or blood products.  The following are some, but not all, of the potential risks that can occur: fever and allergic reactions, hemolytic reactions, transmission of  diseases such as Hepatitis, AIDS and Cytomegalovirus (CMV) and fluid overload.  In the event that I wish to have an autologous transfusion of my own blood, or a directed donor transfusion, I will discuss this with my physician.  Check only if Refusing Blood or Blood Products  I understand refusal of blood or blood products as deemed necessary by my physician may have serious consequences to my condition to include possible death. I hereby assume responsibility for my refusal and release the hospital, its personnel, and my physicians from any responsibility for the consequences of my refusal.    o  Refuse   5.   I authorize the use of any specimen, organs, tissues, body parts or foreign objects that may be removed from my body during the operation/procedure for diagnosis, research or teaching purposes and their subsequent disposal by hospital authorities.  I also authorize the release of specimen test results and/or written reports to my treating physician on the hospital medical staff or other referring or consulting physicians involved in my care, at the discretion of the Pathologist or my treating physician.    6.   I consent to the photographing or videotaping of the operations or procedures to be performed, including appropriate portions of my body for medical, scientific, or educational purposes, provided my identity is not revealed by the pictures or by descriptive texts accompanying them.  If the procedure has been photographed/videotaped, the surgeon will obtain the original picture, image, videotape or CD.  The hospital will not be responsible for storage, release or maintenance of the picture, image, tape or CD.    7.   I consent to the presence of a  or observers in the operating room as deemed necessary by my physician or their designees.    8.   I recognize that in the event my procedure results in extended X-Ray/fluoroscopy time, I may develop a skin reaction.    9. If I have a Do Not  Attempt Resuscitation (DNAR) order in place, that status will be suspended while in the operating room, procedural suite, and during the recovery period unless otherwise explicitly stated by me (or a person authorized to consent on my behalf). The surgeon or my attending physician will determine when the applicable recovery period ends for purposes of reinstating the DNAR order.  10. Patients having a sterilization procedure: I understand that if the procedure is successful the results will be permanent and it will therefore be impossible for me to inseminate, conceive, or bear children.  I also understand that the procedure is intended to result in sterility, although the result has not been guaranteed.   11. I acknowledge that my physician has explained sedation/analgesia administration to me including the risk and benefits I consent to the administration of sedation/analgesia as may be necessary or desirable in the judgment of my physician.    I CERTIFY THAT I HAVE READ AND FULLY UNDERSTAND THE ABOVE CONSENT TO OPERATION and/or OTHER PROCEDURE.     ____________________________________  _________________________________        ______________________________  Signature of Patient    Signature of Responsible Person                Printed Name of Responsible Person                                      ____________________________________  _____________________________                ________________________________  Signature of Witness        Date  Time         Relationship to Patient    STATEMENT OF PHYSICIAN My signature below affirms that prior to the time of the procedure; I have explained to the patient and/or his/her legal representative, the risks and benefits involved in the proposed treatment and any reasonable alternative to the proposed treatment. I have also explained the risks and benefits involved in refusal of the proposed treatment and alternatives to the proposed treatment and have answered the  patient's questions. If I have a significant financial interest in a co-management agreement or a significant financial interest in any product or implant, or other significant relationship used in this procedure/surgery, I have disclosed this and had a discussion with my patient.     _____________________________________________________              _____________________________  (Signature of Physician)                                                                                         (Date)                                   (Time)  Patient Name: Lore Avila      : 2005      Printed: 2025     Medical Record #: [id number]                                      Page 1 of 1

## (undated) NOTE — ED AVS SNAPSHOT
Parent/Legal Guardian Access to the Online Maskless [employer] Record of a Patient 15to 16Years Old  Return completed form by Secure email to [place] HIM/Medical Records Department: [email or web address]     Requirements and Procedures   Under [facility] MyChart ID and password with another person, that person may be able to view my or my child’s health information, and health information about someone who has authorized me as a MyChart proxy.    ·  I agree that it is my responsibility to select a confident Sign-Up Form and I agree to its terms.        Authorization Form     Please enter Patient’s information below:   Name (last, first, middle initial) __________________________________________   Gender  Male  Female    Last 4 Digits of Social Security Number Parent/Legal Guardian Signature                                  For Patient (1517 years of age)  I agree to allow my parent/legal guardian, named above, online access to my medical information currently available and that may become available as a result

## (undated) NOTE — Clinical Note
I saw Sophia West in the DEPARTMENT [facility] today for Viral upper respiratory tract infection  (primary encounter diagnosis). she was treated with bromfed dm and comfort care. Sophia West will follow up with you if no better or as needed.  Thank you for the

## (undated) NOTE — MR AVS SNAPSHOT
[address] [facility] [address]  [place] [address]  [phone]               Thank you for choosing us for your health care visit with MEERA Malone.   We are glad to serve you and happy to provide you with this summary of your vi canal. This lets the doctor see how flexible the eardrum is. Reduced eardrum flexibility is often linked with fluid buildup. Checking the Middle Ear  Your child’s eardrum and middle ear may be tested. Tympanometry and acoustic reflex testing may be done. ear. When the ear is healthy, air pressure remains balanced in the middle ear. The eustachian tube helps control air pressure in the middle ear. The semicircular canals help maintain balance. The vestibular nerve carries balance signals to the brain.  The a For medical emergencies, dial 911.                Visit [facility] online at  [email or web address]

## (undated) NOTE — LETTER
03/31/25      Patient Name:  Lore Avila        To Whom it may concern:    Lore Avila was evaluated in the office today and should be excused from attending work from 3/31/25 through 4/1/25.    She may return to work 4/2/25.      Sincerely,     Catherine Ortiz PA-C

## (undated) NOTE — MR AVS SNAPSHOT
[place]  [address]  [phone]               Thank you for choosing us for your health care visit with Joe Hilliard MD.  We are glad to serve you and happy to provide you with this [employer]:  XR ANKLE (MIN 3 VIEWS), RIGHT (CPT=73610)    Instructions:   To schedule a test at any [facility], call Central Scheduling at [phone], Monday through Friday between 7:30am to 6pm and on Saturday between 8am and To schedule Physical Therapy at any of the [facility] facilities, please call [phone]. [place] for [facility]  [address]  [address] [facility], [address]    [address] [facility] o 4 servings of water a day  o 3 servings of low-fat dairy a day  o 2 or less hours of screen time a day  o 1 or more hours of physical activity a day    To help children live healthy active lives, parents can:  o Be role models themselves by making health

## (undated) NOTE — LETTER
VACCINE ADMINISTRATION RECORD  PARENT / GUARDIAN APPROVAL  Date: 2021  Vaccine administered to:  Nikita Pete     : 2005    MRN: [id number]    A copy of the appropriate Centers for Disease Control and Prevention Vaccine Information statement has

## (undated) NOTE — LETTER
[employer] [place] of [employer] of Child Health Examination       Student's Name  Terisa Clock Birth Date Date     Signature                                                                                                                                              Title                           Date    (If adding dates to the above imm ALLERGIES  (Food, drug, insect, other)  Penicillin G Benzathine MEDICATION  (List all prescribed or taken on a regular basis.)  No current outpatient medications on file. Diagnosis of asthma?   Child wakes during the night coughing   Yes   No    Yes   No SCREENING  BMI>85% age/sex  No And any two of the following:  Family History No    Ethnic Minority  No          Signs of Insulin Resistance (hypertension, dyslipidemia, polycystic ovarian syndrome, acanthosis nigricans)    No           At Risk  No   Lead R Antagonist): No          Controller medication (e.g. inhaled corticosteroid):   No Other   NEEDS/MODIFICATIONS required in the school setting  None DIETARY Needs/Restrictions     None   SPECIAL INSTRUCTIONS/DEVICES e.g. safety glasses, glass eye, chest pro

## (undated) NOTE — Clinical Note
1/20/2017          To Whom It May Concern:    Gogo Friedman is currently under my medical care and may not return gym for 4 weeks. If you require additional information please contact our office.         Sincerely,    Jayjay Glover MD

## (undated) NOTE — LETTER
VACCINE ADMINISTRATION RECORD  PARENT / GUARDIAN APPROVAL  Date: 2018  Vaccine administered to:  Judy Valdes     : 2005    MRN: [id number]    A copy of the appropriate Centers for Disease Control and Prevention Vaccine Information statement has

## (undated) NOTE — LETTER
3/5/2018          To Whom It May Concern:    Jazmine Domínguez is currently under my medical care and may not return to school at this time. Please excuse Lore for 5 days. She may return to school on March 8, 2019. Activity is restricted as follows: none.

## (undated) NOTE — MR AVS SNAPSHOT
[address]  [address] [facility]  [phone]               Thank you for choosing us for your health care visit with Charlotte Jaquez MD.  We are glad to serve you and happy to provide you with this summary Kit Lot # [id number] Numeric    Kit Expiration Date 7/8/18 Date                  [employer]     Sign up for [employer] access for your child.   [employer] access allows you to view health information for your child from their recent   visit, view other health informati

## (undated) NOTE — LETTER
Date & Time: 10/23/2023, 1:19 PM  Patient: Dacia Hawkins  Encounter Provider(s):    Radha Cha MD       To Whom It May Concern:    Selvin Jones was seen and treated in our department on 10/23/2023. She is excused from work for 2 days.     If you have any questions or concerns, please do not hesitate to call.        _____________________________  Physician/APC Signature